# Patient Record
Sex: FEMALE | Race: WHITE | Employment: OTHER | URBAN - METROPOLITAN AREA
[De-identification: names, ages, dates, MRNs, and addresses within clinical notes are randomized per-mention and may not be internally consistent; named-entity substitution may affect disease eponyms.]

---

## 2017-01-10 ENCOUNTER — OFFICE VISIT (OUTPATIENT)
Dept: OBGYN CLINIC | Facility: CLINIC | Age: 62
End: 2017-01-10

## 2017-01-10 VITALS
HEIGHT: 61 IN | BODY MASS INDEX: 26.06 KG/M2 | WEIGHT: 138 LBS | SYSTOLIC BLOOD PRESSURE: 118 MMHG | HEART RATE: 61 BPM | DIASTOLIC BLOOD PRESSURE: 70 MMHG

## 2017-01-10 DIAGNOSIS — Z48.89 POSTOPERATIVE VISIT: Primary | ICD-10-CM

## 2017-01-10 PROCEDURE — 99024 POSTOP FOLLOW-UP VISIT: CPT | Performed by: OBSTETRICS & GYNECOLOGY

## 2017-01-10 NOTE — PROGRESS NOTES
Here for second postoperative visit from anterior repair on November 28th for check for sutures. No issues since surgery. Voiding with problems. No LMP recorded. Patient has had a hysterectomy.   /70 mmHg  Pulse 61  Ht 61\"  Wt 138 lb  BMI 26.09

## 2017-03-28 ENCOUNTER — TELEPHONE (OUTPATIENT)
Dept: OBGYN CLINIC | Facility: CLINIC | Age: 62
End: 2017-03-28

## 2017-03-28 DIAGNOSIS — R92.8 FOLLOW-UP EXAMINATION OF ABNORMAL MAMMOGRAM: Primary | ICD-10-CM

## 2017-03-29 NOTE — TELEPHONE ENCOUNTER
Patient informed that mammogram order was placed. Verbalized understanding. No further questions or concerns.

## 2017-04-11 ENCOUNTER — HOSPITAL ENCOUNTER (OUTPATIENT)
Dept: MAMMOGRAPHY | Facility: HOSPITAL | Age: 62
Discharge: HOME | End: 2017-04-11
Attending: OBSTETRICS & GYNECOLOGY
Payer: COMMERCIAL

## 2017-04-11 DIAGNOSIS — R92.8 FOLLOW-UP EXAMINATION OF ABNORMAL MAMMOGRAM: ICD-10-CM

## 2017-04-11 PROCEDURE — 77065 DX MAMMO INCL CAD UNI: CPT

## 2017-04-11 PROCEDURE — 76642 ULTRASOUND BREAST LIMITED: CPT

## 2017-04-11 PROCEDURE — 77061 BREAST TOMOSYNTHESIS UNI: CPT

## 2017-04-12 NOTE — PROGRESS NOTES
Quick Note:    Stable findings in R breast, and considered benign.  Follow up at annual in 6 months.  ______

## 2017-05-24 ENCOUNTER — HOSPITAL ENCOUNTER (OUTPATIENT)
Age: 62
Discharge: HOME | End: 2017-05-24
Attending: EMERGENCY MEDICINE
Payer: COMMERCIAL

## 2017-05-24 VITALS
HEART RATE: 74 BPM | OXYGEN SATURATION: 97 % | SYSTOLIC BLOOD PRESSURE: 154 MMHG | RESPIRATION RATE: 18 BRPM | BODY MASS INDEX: 23.92 KG/M2 | DIASTOLIC BLOOD PRESSURE: 86 MMHG | HEIGHT: 62 IN | TEMPERATURE: 98 F | WEIGHT: 130 LBS

## 2017-05-24 DIAGNOSIS — S61.219A FINGER LACERATION, INITIAL ENCOUNTER: Primary | ICD-10-CM

## 2017-05-24 PROCEDURE — 99212 OFFICE O/P EST SF 10 MIN: CPT

## 2017-05-24 PROCEDURE — 12001 RPR S/N/AX/GEN/TRNK 2.5CM/<: CPT

## 2017-05-24 PROCEDURE — 99213 OFFICE O/P EST LOW 20 MIN: CPT

## 2017-05-25 NOTE — ED PROVIDER NOTES
Patient Seen in: [facility]    History   Patient presents with:  Laceration Abrasion (integumentary)    Stated Complaint: left finger laceration    HPI    Patient complains of laceration to left fifth finger.   Patient was t 1.00  Years: 21        Quit date: 11/01/1987    Smokeless Status: Never Used                        Alcohol Use: Yes           3.6 oz/week       6 Glasses of wine per week       Comment: 7 glasses weekly      Review of Systems    Positive for stated compla [id number] [phone]    Schedule an appointment as soon as possible for a visit        Medications Prescribed:  Current Discharge Medication List

## 2017-06-03 ENCOUNTER — HOSPITAL ENCOUNTER (OUTPATIENT)
Age: 62
Discharge: HOME | End: 2017-06-03
Attending: FAMILY MEDICINE
Payer: COMMERCIAL

## 2017-06-03 VITALS
BODY MASS INDEX: 24.48 KG/M2 | HEIGHT: 62 IN | HEART RATE: 69 BPM | WEIGHT: 133 LBS | TEMPERATURE: 98 F | DIASTOLIC BLOOD PRESSURE: 66 MMHG | SYSTOLIC BLOOD PRESSURE: 108 MMHG | OXYGEN SATURATION: 99 % | RESPIRATION RATE: 16 BRPM

## 2017-06-03 DIAGNOSIS — Z48.02 ENCOUNTER FOR REMOVAL OF SUTURES: Primary | ICD-10-CM

## 2017-06-03 NOTE — ED INITIAL ASSESSMENT (HPI)
The patient is here for removal of sutures from the left 5th digit. She denies any pain, drainage, or other problems.

## 2017-06-03 NOTE — ED PROVIDER NOTES
Patient is here for suture removal on her left fifth digit at the tip over the pulp of the finger. Sutures are placed about a week ago. Patient denies any pain, tingling, numbness or weakness.   3 simple interrupted sutures were removed without any compli

## 2017-11-01 ENCOUNTER — OFFICE VISIT (OUTPATIENT)
Dept: OBGYN CLINIC | Facility: CLINIC | Age: 62
End: 2017-11-01

## 2017-11-01 VITALS
DIASTOLIC BLOOD PRESSURE: 72 MMHG | WEIGHT: 134 LBS | HEIGHT: 61.75 IN | BODY MASS INDEX: 24.66 KG/M2 | HEART RATE: 64 BPM | SYSTOLIC BLOOD PRESSURE: 112 MMHG

## 2017-11-01 DIAGNOSIS — N60.01 BENIGN CYST OF BREAST, RIGHT: ICD-10-CM

## 2017-11-01 DIAGNOSIS — Z01.411 ENCOUNTER FOR GYNECOLOGICAL EXAMINATION WITH ABNORMAL FINDING: Primary | ICD-10-CM

## 2017-11-01 DIAGNOSIS — R92.8 FOLLOW-UP EXAMINATION OF ABNORMAL MAMMOGRAM: ICD-10-CM

## 2017-11-01 PROCEDURE — 99396 PREV VISIT EST AGE 40-64: CPT | Performed by: OBSTETRICS & GYNECOLOGY

## 2017-11-01 NOTE — PROGRESS NOTES
Patient ID:   Haley Green  is a 57year old female         HPI:     Here for gyne exam. Last seen January for postop visit from laparoscopic LSO and anterior repair. New medical/surgical hx:  None. No LMP recorded.  Patient has had a hys Outpatient Prescriptions on File Prior to Visit:  Pravastatin Sodium 20 MG Oral Tab Take 1 tablet (20 mg total) by mouth nightly. Disp: 90 tablet Rfl: 3   aspirin 81 MG Oral Tab Take 1 tablet (81 mg total) by mouth daily.  Disp: 30 tablet Rfl: 3   Multiple Consults:  [facility] DIAGNOSTIC RIGHT (CPT=77065)    NOTES:  See in one year or as needed.

## 2017-11-30 ENCOUNTER — HOSPITAL ENCOUNTER (OUTPATIENT)
Dept: MAMMOGRAPHY | Facility: HOSPITAL | Age: 62
Discharge: HOME | End: 2017-11-30
Attending: OBSTETRICS & GYNECOLOGY
Payer: COMMERCIAL

## 2017-11-30 DIAGNOSIS — R92.8 FOLLOW-UP EXAMINATION OF ABNORMAL MAMMOGRAM: ICD-10-CM

## 2017-11-30 DIAGNOSIS — N60.01 BENIGN CYST OF BREAST, RIGHT: ICD-10-CM

## 2017-11-30 PROCEDURE — 77066 DX MAMMO INCL CAD BI: CPT | Performed by: OBSTETRICS & GYNECOLOGY

## 2017-11-30 PROCEDURE — 76642 ULTRASOUND BREAST LIMITED: CPT | Performed by: OBSTETRICS & GYNECOLOGY

## 2017-11-30 PROCEDURE — 77062 BREAST TOMOSYNTHESIS BI: CPT | Performed by: OBSTETRICS & GYNECOLOGY

## 2018-12-03 ENCOUNTER — HOSPITAL ENCOUNTER (OUTPATIENT)
Dept: MAMMOGRAPHY | Facility: HOSPITAL | Age: 63
Discharge: HOME | End: 2018-12-03
Attending: INTERNAL MEDICINE
Payer: MEDICARE

## 2018-12-03 DIAGNOSIS — Z12.31 ENCOUNTER FOR SCREENING MAMMOGRAM FOR MALIGNANT NEOPLASM OF BREAST: ICD-10-CM

## 2018-12-03 PROCEDURE — 77067 SCR MAMMO BI INCL CAD: CPT | Performed by: INTERNAL MEDICINE

## 2018-12-03 PROCEDURE — 77063 BREAST TOMOSYNTHESIS BI: CPT | Performed by: INTERNAL MEDICINE

## 2019-11-13 ENCOUNTER — OFFICE VISIT (OUTPATIENT)
Dept: OBGYN CLINIC | Facility: CLINIC | Age: 64
End: 2019-11-13
Payer: MEDICARE

## 2019-11-13 VITALS
BODY MASS INDEX: 25.21 KG/M2 | WEIGHT: 137 LBS | SYSTOLIC BLOOD PRESSURE: 138 MMHG | HEIGHT: 62 IN | DIASTOLIC BLOOD PRESSURE: 76 MMHG

## 2019-11-13 DIAGNOSIS — N81.11 CYSTOCELE, MIDLINE: ICD-10-CM

## 2019-11-13 DIAGNOSIS — Z01.419 WELL WOMAN EXAM WITH ROUTINE GYNECOLOGICAL EXAM: Primary | ICD-10-CM

## 2019-11-13 PROCEDURE — G0101 CA SCREEN;PELVIC/BREAST EXAM: HCPCS | Performed by: OBSTETRICS & GYNECOLOGY

## 2019-11-13 NOTE — PROGRESS NOTES
GYN H&P     2019  5:48 PM    CC: Patient is here for annual exam, cystocele    HPI: patient is a 75year old  here for her annual gyn exam.   She reports a h/o ELMER and LSO as well as cystocele repair by Dr. Cata Bolaños.  However, she feels as though VOMITING  Latex                   ITCHING  OMEGA-3 FATTY ACIDS OR, , Disp: , Rfl:   Magnesium 400 MG Oral Cap, , Disp: , Rfl:   Probiotic Product (PROBIOTIC OR), , Disp: , Rfl:   Omega-3 Fatty Acids (OMEGAPURE 780 EC OR), , Disp: , Rfl:   Pravastatin Sodiu change and no tenderness. Breasts are symmetrical.   Abdominal: Soft. Normal appearance and bowel sounds are normal. She exhibits no mass. Well healed midline vertical skin incision and laparoscopy skin incisions   Genitourinary: No labial fusion.  There

## 2019-12-18 ENCOUNTER — HOSPITAL ENCOUNTER (OUTPATIENT)
Dept: MAMMOGRAPHY | Facility: HOSPITAL | Age: 64
Discharge: HOME | End: 2019-12-18
Attending: OBSTETRICS & GYNECOLOGY
Payer: MEDICARE

## 2019-12-18 DIAGNOSIS — Z01.419 WELL WOMAN EXAM WITH ROUTINE GYNECOLOGICAL EXAM: ICD-10-CM

## 2019-12-18 PROCEDURE — 77063 BREAST TOMOSYNTHESIS BI: CPT | Performed by: OBSTETRICS & GYNECOLOGY

## 2019-12-18 PROCEDURE — 77067 SCR MAMMO BI INCL CAD: CPT | Performed by: OBSTETRICS & GYNECOLOGY

## 2019-12-24 ENCOUNTER — HOSPITAL ENCOUNTER (OUTPATIENT)
Dept: MAMMOGRAPHY | Facility: HOSPITAL | Age: 64
Discharge: HOME | End: 2019-12-24
Attending: OBSTETRICS & GYNECOLOGY
Payer: MEDICARE

## 2019-12-24 DIAGNOSIS — R92.2 INCONCLUSIVE MAMMOGRAM: ICD-10-CM

## 2019-12-24 PROCEDURE — 77062 BREAST TOMOSYNTHESIS BI: CPT | Performed by: OBSTETRICS & GYNECOLOGY

## 2019-12-24 PROCEDURE — 77066 DX MAMMO INCL CAD BI: CPT | Performed by: OBSTETRICS & GYNECOLOGY

## 2019-12-24 PROCEDURE — 76642 ULTRASOUND BREAST LIMITED: CPT | Performed by: OBSTETRICS & GYNECOLOGY

## 2019-12-26 ENCOUNTER — TELEPHONE (OUTPATIENT)
Dept: MAMMOGRAPHY | Facility: HOSPITAL | Age: 64
End: 2019-12-26

## 2019-12-26 NOTE — PROGRESS NOTES
Patient notified. Patient verbalized understanding. Pt was already informed by mammography of results and recommendations.

## 2019-12-26 NOTE — TELEPHONE ENCOUNTER
Patient called with additional questions re: upcoming breast biopsy. Questions answered and patient verbalized understanding.

## 2019-12-26 NOTE — PROGRESS NOTES
Results reviewed. Please inform patient radiology recommends a breast biopsy. They may have already informed her. The area is very small and has a low suspicion for malignancy but because it is new they recommend biopsy.  Thank you

## 2020-01-13 ENCOUNTER — HOSPITAL ENCOUNTER (OUTPATIENT)
Dept: MAMMOGRAPHY | Facility: HOSPITAL | Age: 65
Discharge: HOME | End: 2020-01-13
Attending: OBSTETRICS & GYNECOLOGY
Payer: MEDICARE

## 2020-01-13 DIAGNOSIS — R92.8 ABNORMAL MAMMOGRAM: ICD-10-CM

## 2020-01-13 PROCEDURE — 88305 TISSUE EXAM BY PATHOLOGIST: CPT | Performed by: OBSTETRICS & GYNECOLOGY

## 2020-01-13 PROCEDURE — 19081 BX BREAST 1ST LESION STRTCTC: CPT | Performed by: OBSTETRICS & GYNECOLOGY

## 2020-01-14 NOTE — PROGRESS NOTES
Please let pt know her breast bx was normal-->a variant of fibrocystic breast changes. I would recommend she f/u in 6 months, to ensure no further changes, but she does not need further treatment at this time. Thanks!

## 2020-01-16 ENCOUNTER — TELEPHONE (OUTPATIENT)
Dept: MAMMOGRAPHY | Facility: HOSPITAL | Age: 65
End: 2020-01-16

## 2020-01-16 NOTE — TELEPHONE ENCOUNTER
Telephoned [place] and name,  verified with patient. Notified [place] of negative for malignancy right breast biopsy result. Concordance pending. [place] King reports biopsy site is healing well. Hematoma management discussed.  Radiolo

## 2020-03-10 ENCOUNTER — OFFICE VISIT (OUTPATIENT)
Dept: UROLOGY | Facility: HOSPITAL | Age: 65
End: 2020-03-10
Attending: OBSTETRICS & GYNECOLOGY
Payer: MEDICARE

## 2020-03-10 VITALS
WEIGHT: 137 LBS | BODY MASS INDEX: 25.21 KG/M2 | SYSTOLIC BLOOD PRESSURE: 161 MMHG | DIASTOLIC BLOOD PRESSURE: 92 MMHG | HEIGHT: 62 IN

## 2020-03-10 DIAGNOSIS — N81.84 PELVIC MUSCLE WASTING: ICD-10-CM

## 2020-03-10 DIAGNOSIS — N39.3 FEMALE STRESS INCONTINENCE: ICD-10-CM

## 2020-03-10 DIAGNOSIS — N95.2 POSTMENOPAUSAL ATROPHIC VAGINITIS: ICD-10-CM

## 2020-03-10 DIAGNOSIS — R35.0 FREQUENCY OF MICTURITION: Primary | ICD-10-CM

## 2020-03-10 DIAGNOSIS — N81.11 CYSTOCELE, MIDLINE: ICD-10-CM

## 2020-03-10 DIAGNOSIS — N99.3 PROLAPSE OF VAGINAL VAULT AFTER HYSTERECTOMY: ICD-10-CM

## 2020-03-10 LAB
BLOOD URINE: NEGATIVE
CONTROL RUN WITHIN 24 HOURS?: YES
LEUKOCYTE ESTERASE URINE: NEGATIVE
NITRITE URINE: NEGATIVE

## 2020-03-10 PROCEDURE — 87086 URINE CULTURE/COLONY COUNT: CPT | Performed by: OBSTETRICS & GYNECOLOGY

## 2020-03-10 NOTE — PROGRESS NOTES
Cea, DO  3/10/2020     Referred by Dr. Davey Bailey  Pt here with self    Patient presents with:  Prolapse: referred by Dr. Jesu Ruiz repair with laparoscopy 2016  hyst 2000 for fibroids    HPI:  +LUIS  Rare UUI  + prolapse, shifts position to voi Smoker        Packs/day: 1.00        Years: 20.00        Pack years: 21        Quit date: 1987        Years since quittin.3      Smokeless tobacco: Never Used    Alcohol use:  Yes      Alcohol/week: 6.0 standard drinks      Types: 6 Glasses of win lesions  Urethra: +atrophy, nontender  Bladder:+fullness, nontender  Vagina: +atrophy  Cervix & Uterus: absent  Adnexa:no masses, nontender  Perineum: nontender  Anus: wnl  Rectum: defer    PELVIS FLOOR NEUROMUSCULAR FUNCTION:  Strength:  2 and Unable to h cream    Treatment Plan, Surgical:   Anterior repair  Uterosacral suspension  Mid-urethral slings (Trans Vaginal Taping, TOT, single-incision)    Pt verbalizes understanding of all above discussed information. All questions answered.  She agrees to plan

## 2020-05-26 ENCOUNTER — OFFICE VISIT (OUTPATIENT)
Dept: UROLOGY | Facility: HOSPITAL | Age: 65
End: 2020-05-26
Attending: OBSTETRICS & GYNECOLOGY
Payer: MEDICARE

## 2020-05-26 VITALS — RESPIRATION RATE: 18 BRPM | BODY MASS INDEX: 25.21 KG/M2 | TEMPERATURE: 97 F | HEIGHT: 62 IN | WEIGHT: 137 LBS

## 2020-05-26 DIAGNOSIS — N39.3 FEMALE STRESS INCONTINENCE: Primary | ICD-10-CM

## 2020-05-26 PROCEDURE — 51784 ANAL/URINARY MUSCLE STUDY: CPT

## 2020-05-26 PROCEDURE — 51729 CYSTOMETROGRAM W/VP&UP: CPT

## 2020-05-26 PROCEDURE — 81002 URINALYSIS NONAUTO W/O SCOPE: CPT

## 2020-05-26 PROCEDURE — 51741 ELECTRO-UROFLOWMETRY FIRST: CPT

## 2020-05-26 PROCEDURE — 51797 INTRAABDOMINAL PRESSURE TEST: CPT

## 2020-05-26 NOTE — PATIENT INSTRUCTIONS
[address]  [place] [id number]  [facility]: [phone]  FAX: [phone]       Urodynamic Testing Discharge Instructions: There are NO dietary or activity restrictions.   You may resume sensations for a while. The catheter may stay in place for a week or more after you go home. Where will I go to get the catheter removed? Your catheter will be removed in the office. Please call the office to schedule a voiding trial with the nurse. frequently in small amounts. · You experience abdominal bloating, pressure or back pain. · You have a fever over 100.5 for 4 hours or above 101.0 anytime. · You have nausea and/or vomiting. · Burning/pain with urination.     Please feel free to call the

## 2020-05-29 ENCOUNTER — LAB ENCOUNTER (OUTPATIENT)
Dept: LAB | Facility: HOSPITAL | Age: 65
End: 2020-05-29
Attending: INTERNAL MEDICINE
Payer: MEDICARE

## 2020-05-29 DIAGNOSIS — I10 HYPERTENSION: Primary | ICD-10-CM

## 2020-05-29 PROCEDURE — 80048 BASIC METABOLIC PNL TOTAL CA: CPT

## 2020-05-29 PROCEDURE — 36415 COLL VENOUS BLD VENIPUNCTURE: CPT

## 2020-06-09 ENCOUNTER — VIRTUAL PHONE E/M (OUTPATIENT)
Dept: UROLOGY | Facility: HOSPITAL | Age: 65
End: 2020-06-09
Attending: OBSTETRICS & GYNECOLOGY
Payer: MEDICARE

## 2020-06-09 DIAGNOSIS — N39.3 FEMALE STRESS INCONTINENCE: Primary | ICD-10-CM

## 2020-06-09 DIAGNOSIS — N99.3 PROLAPSE OF VAGINAL VAULT AFTER HYSTERECTOMY: ICD-10-CM

## 2020-06-09 DIAGNOSIS — R33.9 INCOMPLETE BLADDER EMPTYING: ICD-10-CM

## 2020-06-09 RX ORDER — ACETAMINOPHEN 160 MG
2000 TABLET,DISINTEGRATING ORAL DAILY
COMMUNITY

## 2020-06-09 NOTE — PROGRESS NOTES
Pt presents w/ initial c/o bulge  Urodynamic testing undergone without complication. Results reviewed with patient via telephone  Given circumstances surrounding COVID-19 this visit is being conducted as a televisit with pt's consent.     268/125cc & 678/8

## 2020-06-19 ENCOUNTER — TELEPHONE (OUTPATIENT)
Dept: OBGYN CLINIC | Facility: CLINIC | Age: 65
End: 2020-06-19

## 2020-06-19 DIAGNOSIS — Z98.890 HISTORY OF BENIGN BREAST BIOPSY: Primary | ICD-10-CM

## 2020-06-19 DIAGNOSIS — R92.8 OTHER ABNORMAL AND INCONCLUSIVE FINDINGS ON DIAGNOSTIC IMAGING OF BREAST: ICD-10-CM

## 2020-06-19 NOTE — TELEPHONE ENCOUNTER
Patient had breast biopsy on 01/14/2020. Results were neg but advised christie + US in 6 months. Order entered. No further questions or concerns.

## 2020-06-25 ENCOUNTER — HOSPITAL ENCOUNTER (OUTPATIENT)
Dept: MAMMOGRAPHY | Facility: HOSPITAL | Age: 65
Discharge: HOME | End: 2020-06-25
Attending: OBSTETRICS & GYNECOLOGY
Payer: MEDICARE

## 2020-06-25 DIAGNOSIS — R92.8 OTHER ABNORMAL AND INCONCLUSIVE FINDINGS ON DIAGNOSTIC IMAGING OF BREAST: ICD-10-CM

## 2020-06-25 DIAGNOSIS — Z98.890 HISTORY OF BENIGN BREAST BIOPSY: ICD-10-CM

## 2020-06-25 PROCEDURE — 77062 BREAST TOMOSYNTHESIS BI: CPT | Performed by: OBSTETRICS & GYNECOLOGY

## 2020-06-25 PROCEDURE — 77066 DX MAMMO INCL CAD BI: CPT | Performed by: OBSTETRICS & GYNECOLOGY

## 2020-07-07 ENCOUNTER — TELEPHONE (OUTPATIENT)
Dept: UROLOGY | Facility: HOSPITAL | Age: 65
End: 2020-07-07

## 2020-07-07 ENCOUNTER — OFFICE VISIT (OUTPATIENT)
Dept: UROLOGY | Facility: HOSPITAL | Age: 65
End: 2020-07-07
Attending: OBSTETRICS & GYNECOLOGY
Payer: MEDICARE

## 2020-07-07 VITALS — TEMPERATURE: 98 F | BODY MASS INDEX: 25.21 KG/M2 | RESPIRATION RATE: 16 BRPM | HEIGHT: 62 IN | WEIGHT: 137 LBS

## 2020-07-07 DIAGNOSIS — N39.3 FEMALE STRESS INCONTINENCE: ICD-10-CM

## 2020-07-07 DIAGNOSIS — N81.11 CYSTOCELE, MIDLINE: Primary | ICD-10-CM

## 2020-07-07 DIAGNOSIS — N99.3 PROLAPSE OF VAGINAL VAULT AFTER HYSTERECTOMY: ICD-10-CM

## 2020-07-07 DIAGNOSIS — R33.9 INCOMPLETE BLADDER EMPTYING: ICD-10-CM

## 2020-07-07 PROCEDURE — 99212 OFFICE O/P EST SF 10 MIN: CPT

## 2020-07-07 PROCEDURE — 57160 INSERT PESSARY/OTHER DEVICE: CPT

## 2020-07-07 RX ORDER — ESTRADIOL 0.1 MG/G
CREAM VAGINAL
Qty: 1 TUBE | Refills: 3 | Status: SHIPPED | OUTPATIENT
Start: 2020-07-07 | End: 2021-06-15

## 2020-07-07 NOTE — TELEPHONE ENCOUNTER
Pt calling today after visit. C/o pessary \"moving too much\". Pt is pushing it back up into place. Pt offered RN visit tmrw for re-fitting. Plan per Dr Ruma Garcia to try bigger incont dish w/ smaller knob.

## 2020-07-07 NOTE — PATIENT INSTRUCTIONS
[facility]  [facility]    Fingertip Application Method for Estrogen Vaginal Cream    1. Wash you hands with soap and water and dry thoroughly.     2.  Squeeze out enough cream from the tube to cover 1/2 of your index fin bladder. 2. Wash and dry hands. 3. You may prefer to sit or stand when inserting pessary. 4. Locate the notches on inside of pessary, fold it like a “taco” at these notches. 5. Lubricate with tap water and insert narrow end into vaginal opening.   6. Pu

## 2020-07-07 NOTE — PROGRESS NOTES
Patient presents to follow up LUIS, incomplete bladder emptying, nocturia    She is currently interested in pessary trial    Temp 98.2 °F (36.8 °C)   Resp 16   Ht 62\"   Wt 137 lb (62.1 kg)   BMI 25.06 kg/m²     GEN: NAD  CV: RRR  Pulm: nl effort  Abd: soft

## 2020-07-08 ENCOUNTER — OFFICE VISIT (OUTPATIENT)
Dept: UROLOGY | Facility: HOSPITAL | Age: 65
End: 2020-07-08
Attending: OBSTETRICS & GYNECOLOGY
Payer: MEDICARE

## 2020-07-08 ENCOUNTER — TELEPHONE (OUTPATIENT)
Dept: UROLOGY | Facility: HOSPITAL | Age: 65
End: 2020-07-08

## 2020-07-08 VITALS — BODY MASS INDEX: 25.21 KG/M2 | WEIGHT: 137 LBS | HEIGHT: 62 IN | RESPIRATION RATE: 20 BRPM

## 2020-07-08 DIAGNOSIS — N39.3 FEMALE STRESS INCONTINENCE: ICD-10-CM

## 2020-07-08 DIAGNOSIS — N99.3 PROLAPSE OF VAGINAL VAULT AFTER HYSTERECTOMY: Primary | ICD-10-CM

## 2020-07-08 DIAGNOSIS — N81.11 CYSTOCELE, MIDLINE: ICD-10-CM

## 2020-07-08 DIAGNOSIS — R33.9 INCOMPLETE BLADDER EMPTYING: ICD-10-CM

## 2020-07-08 DIAGNOSIS — R33.9 INCOMPLETE EMPTYING OF BLADDER: ICD-10-CM

## 2020-07-08 DIAGNOSIS — N99.3 VAGINAL VAULT PROLAPSE AFTER HYSTERECTOMY: Primary | ICD-10-CM

## 2020-07-08 DIAGNOSIS — N39.3 SUI (STRESS URINARY INCONTINENCE, FEMALE): ICD-10-CM

## 2020-07-08 PROCEDURE — 57160 INSERT PESSARY/OTHER DEVICE: CPT

## 2020-07-08 NOTE — TELEPHONE ENCOUNTER
Pt phoned back after speaking to Dr Tapia Daily and informed of verbal order to go ahead and proceed w/ PT, but that it would need to be a short trial b/c pt has incomplete emptying. Pt plans to try # 3 incont dish once PF has more strength.  Pt als intereste

## 2020-07-08 NOTE — PROCEDURES
Patient here for pessary re-fitting. # 3 small knob incont dish pessary was fitted on 7/7/20 and patient states once home, she felt it slipping down. Pt kept pushing it back in. # 4 sml knob incont dish pessary was placed without difficulty.   Patient abl

## 2020-07-08 NOTE — TELEPHONE ENCOUNTER
Pt calling  2hrs after visit saying # 4 sml knob incont dish is uncomfortable as well. Pt took pessary out and noted some blood to device. Pt still has # 3 incont dish.  Discussed w/ pt we are a bit limited now w/ pessary choices since she didn't care for t

## 2020-07-21 ENCOUNTER — OFFICE VISIT (OUTPATIENT)
Dept: PHYSICAL THERAPY | Facility: HOSPITAL | Age: 65
End: 2020-07-21
Attending: OBSTETRICS & GYNECOLOGY
Payer: MEDICARE

## 2020-07-21 DIAGNOSIS — N39.3 SUI (STRESS URINARY INCONTINENCE, FEMALE): ICD-10-CM

## 2020-07-21 DIAGNOSIS — N81.11 CYSTOCELE, MIDLINE: ICD-10-CM

## 2020-07-21 DIAGNOSIS — R33.9 INCOMPLETE EMPTYING OF BLADDER: ICD-10-CM

## 2020-07-21 DIAGNOSIS — N99.3 VAGINAL VAULT PROLAPSE AFTER HYSTERECTOMY: ICD-10-CM

## 2020-07-21 PROCEDURE — 97110 THERAPEUTIC EXERCISES: CPT

## 2020-07-21 PROCEDURE — 97112 NEUROMUSCULAR REEDUCATION: CPT

## 2020-07-21 PROCEDURE — 97161 PT EVAL LOW COMPLEX 20 MIN: CPT

## 2020-07-21 NOTE — PROGRESS NOTES
MUSCULOSKELETAL AND PELVIC FLOOR EVALUATION:   Referring Physician: Dr. 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 no    BOWEL HABITS  Types of symptoms:none    SEXUAL HEALTH STATUS  Marinoff Scale: 0      ASSESSMENT  Kristylenny Thurston presents to physical therapy evaluation with primary c/o pelvic floor muscle weakness.  The results of the 2/3/3/3  External Anal Sphincter: nt  Accessory Muscle Use: gluteals, adductors, abdominals    Tissue Laxity Test:  Anterior Wall: Marked  Posterior Wall: Min  Apical: Mod    Internal Palpation: WNL     Today's Treatment and Response:   Patient education w Program instruction and Modalities to include: Electrical stimulation (unattended) and Ultrasound     Education or treatment limitation: None  Rehab Potential:good      Patient/Family/Caregiver was advised of these findings, precautions, and treatment opti

## 2020-07-23 ENCOUNTER — OFFICE VISIT (OUTPATIENT)
Dept: PHYSICAL THERAPY | Facility: HOSPITAL | Age: 65
End: 2020-07-23
Attending: OBSTETRICS & GYNECOLOGY
Payer: MEDICARE

## 2020-07-23 PROCEDURE — 97110 THERAPEUTIC EXERCISES: CPT

## 2020-07-23 PROCEDURE — 97112 NEUROMUSCULAR REEDUCATION: CPT

## 2020-07-23 NOTE — PROGRESS NOTES
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 3. Patient will demonstrate improve PFM isolation, coordination and strength to 3/6/6/6 so she is able to reduce urinary urge and prevent leakage during ADL's.  4. Nocturnal voiding 1x/night for improved sleep.   5. P

## 2020-08-04 ENCOUNTER — OFFICE VISIT (OUTPATIENT)
Dept: PHYSICAL THERAPY | Facility: HOSPITAL | Age: 65
End: 2020-08-04
Attending: OBSTETRICS & GYNECOLOGY
Payer: MEDICARE

## 2020-08-04 PROCEDURE — 97112 NEUROMUSCULAR REEDUCATION: CPT

## 2020-08-04 PROCEDURE — 97110 THERAPEUTIC EXERCISES: CPT

## 2020-08-04 NOTE — PROGRESS NOTES
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 2. Patient will demonstrate improved bladder voiding habits to voiding every 2 hours without urinary leakage.   3. Patient will demonstrate improve PFM isolation, coordination and strength to 3/6/6/6 s

## 2020-08-06 ENCOUNTER — APPOINTMENT (OUTPATIENT)
Dept: PHYSICAL THERAPY | Facility: HOSPITAL | Age: 65
End: 2020-08-06
Attending: OBSTETRICS & GYNECOLOGY
Payer: MEDICARE

## 2020-08-11 ENCOUNTER — OFFICE VISIT (OUTPATIENT)
Dept: PHYSICAL THERAPY | Facility: HOSPITAL | Age: 65
End: 2020-08-11
Attending: OBSTETRICS & GYNECOLOGY
Payer: MEDICARE

## 2020-08-11 PROCEDURE — 97112 NEUROMUSCULAR REEDUCATION: CPT

## 2020-08-11 PROCEDURE — 97110 THERAPEUTIC EXERCISES: CPT

## 2020-08-11 NOTE — PROGRESS NOTES
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 2. Patient will demonstrate improved bladder voiding habits to voiding every 2 hours without urinary leakage.   3. Patient will demonstrate improve PFM isolation, coordination and stre w pelvic brace x10    Issued pelvic brace handout Ther Ex:     Partial Formal re-assessment     Shuttle- DLP  25# with pelvic brace + iso hip adduction x30 reps    Balance system weight shift R/L w pelvic brace x3 min    Sit on ball-  pelvic brace + U/LE l

## 2020-08-13 ENCOUNTER — APPOINTMENT (OUTPATIENT)
Dept: PHYSICAL THERAPY | Facility: HOSPITAL | Age: 65
End: 2020-08-13
Attending: OBSTETRICS & GYNECOLOGY
Payer: MEDICARE

## 2020-08-18 ENCOUNTER — OFFICE VISIT (OUTPATIENT)
Dept: UROLOGY | Facility: HOSPITAL | Age: 65
End: 2020-08-18
Attending: OBSTETRICS & GYNECOLOGY
Payer: MEDICARE

## 2020-08-18 VITALS
HEIGHT: 62 IN | HEART RATE: 64 BPM | DIASTOLIC BLOOD PRESSURE: 77 MMHG | WEIGHT: 133 LBS | SYSTOLIC BLOOD PRESSURE: 136 MMHG | BODY MASS INDEX: 24.48 KG/M2

## 2020-08-18 DIAGNOSIS — N99.3 VAGINAL VAULT PROLAPSE AFTER HYSTERECTOMY: Primary | ICD-10-CM

## 2020-08-18 DIAGNOSIS — R33.9 INCOMPLETE EMPTYING OF BLADDER: ICD-10-CM

## 2020-08-18 DIAGNOSIS — N95.2 POSTMENOPAUSAL ATROPHIC VAGINITIS: ICD-10-CM

## 2020-08-18 DIAGNOSIS — N81.84 PELVIC MUSCLE WASTING: ICD-10-CM

## 2020-08-18 PROCEDURE — 51798 US URINE CAPACITY MEASURE: CPT

## 2020-08-18 PROCEDURE — 99212 OFFICE O/P EST SF 10 MIN: CPT

## 2020-08-18 NOTE — PROGRESS NOTES
Patient presents to follow up bulge    She is currently using pessary, pelvic floor PT, vag estrogen    Bulge no worse  Pessary uncomfortable, trying different styles  Denies vag bleeding or discharge    Voids improved, denies UTI      /77   Pulse 64

## 2020-08-20 ENCOUNTER — TELEPHONE (OUTPATIENT)
Dept: UROLOGY | Facility: HOSPITAL | Age: 65
End: 2020-08-20

## 2020-08-20 NOTE — TELEPHONE ENCOUNTER
Pt called after visit d/t pessary was uncomfortable so she removed it this morning, which she does self maintain and she had light yellow/brownish discharge.   Patient wondering if this is normal.  Explained to patient it is normal to have discharge d/t for

## 2020-08-24 ENCOUNTER — OFFICE VISIT (OUTPATIENT)
Dept: PHYSICAL THERAPY | Facility: HOSPITAL | Age: 65
End: 2020-08-24
Attending: OBSTETRICS & GYNECOLOGY
Payer: MEDICARE

## 2020-08-24 PROCEDURE — 97112 NEUROMUSCULAR REEDUCATION: CPT

## 2020-08-24 PROCEDURE — 97110 THERAPEUTIC EXERCISES: CPT

## 2020-08-24 NOTE — PROGRESS NOTES
Discharge Summary  Pt has attended 5 visits in Physical Therapy.       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 All goals met. Pt motivated to continue HEP.       Goals: muscle anatomy and phys    biofeedback -  external sensor was placed and leads were attached  Resting tone  Tonic  Phasic  Huslia  with coordinated breathing       Biofeedback with constant interpretation of results.        PFM NM  Re-ed     bladder/Bowel v

## 2020-08-25 ENCOUNTER — APPOINTMENT (OUTPATIENT)
Dept: PHYSICAL THERAPY | Facility: HOSPITAL | Age: 65
End: 2020-08-25
Attending: OBSTETRICS & GYNECOLOGY
Payer: MEDICARE

## 2020-08-27 ENCOUNTER — APPOINTMENT (OUTPATIENT)
Dept: PHYSICAL THERAPY | Facility: HOSPITAL | Age: 65
End: 2020-08-27
Attending: OBSTETRICS & GYNECOLOGY
Payer: MEDICARE

## 2020-10-13 ENCOUNTER — TELEPHONE (OUTPATIENT)
Dept: UROLOGY | Facility: HOSPITAL | Age: 65
End: 2020-10-13

## 2020-10-13 NOTE — TELEPHONE ENCOUNTER
Pt called nurseline to report she cancelled today's follow up with Dr. Jayme Ramirez as she has been unable to wear pessary and is considering surgery, would like to speak with RN,  mallory Au for return call from patient to discuss.

## 2021-02-22 ENCOUNTER — OFFICE VISIT (OUTPATIENT)
Dept: UROLOGY | Facility: HOSPITAL | Age: 66
End: 2021-02-22
Attending: OBSTETRICS & GYNECOLOGY
Payer: MEDICARE

## 2021-02-22 ENCOUNTER — TELEPHONE (OUTPATIENT)
Dept: UROLOGY | Facility: HOSPITAL | Age: 66
End: 2021-02-22

## 2021-02-22 DIAGNOSIS — R30.0 DYSURIA: Primary | ICD-10-CM

## 2021-02-22 LAB
MULTISTIX LOT#: 3014 NUMERIC
NITRITE, URINE: NEGATIVE
OCCULT BLOOD: NEGATIVE

## 2021-02-22 PROCEDURE — 81002 URINALYSIS NONAUTO W/O SCOPE: CPT

## 2021-02-22 PROCEDURE — 87186 SC STD MICRODIL/AGAR DIL: CPT

## 2021-02-22 PROCEDURE — 87077 CULTURE AEROBIC IDENTIFY: CPT

## 2021-02-22 PROCEDURE — 87086 URINE CULTURE/COLONY COUNT: CPT

## 2021-02-22 NOTE — TELEPHONE ENCOUNTER
patient feels like she has a UTI, c/o dysuria, did home chemstrip and it was positive for Leuk's, offered to have patient come in later this morning to drop off a urine, she agree's, will come in at 11:30am

## 2021-02-22 NOTE — PROGRESS NOTES
Patient here for UTI screen, c/o dysuria and frequency on and off over the last 2 days, did a home urine dip with positive Leuk's, chemstrip performed, not sure if she has a UTI, will PEND for culture results

## 2021-02-24 ENCOUNTER — TELEPHONE (OUTPATIENT)
Dept: UROLOGY | Facility: HOSPITAL | Age: 66
End: 2021-02-24

## 2021-02-24 RX ORDER — NITROFURANTOIN 25; 75 MG/1; MG/1
100 CAPSULE ORAL 2 TIMES DAILY
Qty: 14 CAPSULE | Refills: 0 | Status: SHIPPED | OUTPATIENT
Start: 2021-02-24 | End: 2021-03-03

## 2021-02-24 NOTE — TELEPHONE ENCOUNTER
Per written order from Dr. Paige Strong, maacrobid 100 mg tabs one tab po BID for 7 days, patient notified, medication e-scribed

## 2021-03-04 ENCOUNTER — OFFICE VISIT (OUTPATIENT)
Dept: OBGYN CLINIC | Facility: CLINIC | Age: 66
End: 2021-03-04
Payer: MEDICARE

## 2021-03-04 VITALS
BODY MASS INDEX: 24.48 KG/M2 | HEIGHT: 62 IN | WEIGHT: 133 LBS | DIASTOLIC BLOOD PRESSURE: 76 MMHG | SYSTOLIC BLOOD PRESSURE: 114 MMHG

## 2021-03-04 DIAGNOSIS — Z13.820 SCREENING FOR OSTEOPOROSIS: ICD-10-CM

## 2021-03-04 DIAGNOSIS — Z12.39 ENCOUNTER FOR SCREENING FOR MALIGNANT NEOPLASM OF BREAST, UNSPECIFIED SCREENING MODALITY: ICD-10-CM

## 2021-03-04 DIAGNOSIS — Z01.419 WELL WOMAN EXAM WITH ROUTINE GYNECOLOGICAL EXAM: Primary | ICD-10-CM

## 2021-03-04 DIAGNOSIS — M85.88 OTHER SPECIFIED DISORDERS OF BONE DENSITY AND STRUCTURE, OTHER SITE: ICD-10-CM

## 2021-03-04 DIAGNOSIS — Z12.31 ENCOUNTER FOR SCREENING MAMMOGRAM FOR MALIGNANT NEOPLASM OF BREAST: ICD-10-CM

## 2021-03-04 DIAGNOSIS — N81.11 CYSTOCELE, MIDLINE: ICD-10-CM

## 2021-03-04 PROCEDURE — G0101 CA SCREEN;PELVIC/BREAST EXAM: HCPCS | Performed by: OBSTETRICS & GYNECOLOGY

## 2021-03-04 NOTE — PROGRESS NOTES
GYN H&P     3/4/2021  12:47 PM    CC: Patient is here for annual exam    HPI: patient is a 77year old  here for her annual exam  Pt with h/o ELMER, LSO and cystocele repair by Dr. Latisha Ruiz    Seen last year and felt cystocele had returned.  She was refe (CPT=19081)  01/2020   • OOPHORECTOMY Left 11/30/2016    left salpingooopherectomy   • TONSILLECTOMY         Morphine                NAUSEA AND VOMITING  Latex                   ITCHING    •  Pravastatin Sodium 40 MG Oral Tab, Take 1 tablet (40 mg total) b BMI 24.33 kg/m²     Physical Exam   Vitals reviewed. Constitutional: She appears well-developed and well-nourished. Pulmonary/Chest: Right breast exhibits no inverted nipple, no mass, no nipple discharge, no skin change and no tenderness.  Left breast e complex.    F/u for annual exam or PRN    Santos Obrien MD

## 2021-06-07 ENCOUNTER — ORDER TRANSCRIPTION (OUTPATIENT)
Dept: SLEEP CENTER | Age: 66
End: 2021-06-07

## 2021-06-07 DIAGNOSIS — G47.33 OBSTRUCTIVE SLEEP APNEA (ADULT) (PEDIATRIC): Primary | ICD-10-CM

## 2021-06-12 ENCOUNTER — LAB ENCOUNTER (OUTPATIENT)
Dept: LAB | Facility: HOSPITAL | Age: 66
End: 2021-06-12
Attending: INTERNAL MEDICINE
Payer: MEDICARE

## 2021-06-12 DIAGNOSIS — G47.33 OBSTRUCTIVE SLEEP APNEA (ADULT) (PEDIATRIC): ICD-10-CM

## 2021-06-15 ENCOUNTER — OFFICE VISIT (OUTPATIENT)
Dept: SLEEP CENTER | Age: 66
End: 2021-06-15
Attending: INTERNAL MEDICINE
Payer: MEDICARE

## 2021-06-15 ENCOUNTER — OFFICE VISIT (OUTPATIENT)
Dept: UROLOGY | Facility: HOSPITAL | Age: 66
End: 2021-06-15
Attending: OBSTETRICS & GYNECOLOGY
Payer: MEDICARE

## 2021-06-15 VITALS — WEIGHT: 133 LBS | HEIGHT: 62 IN | TEMPERATURE: 98 F | BODY MASS INDEX: 24.48 KG/M2

## 2021-06-15 DIAGNOSIS — R33.9 INCOMPLETE EMPTYING OF BLADDER: ICD-10-CM

## 2021-06-15 DIAGNOSIS — N99.3 PROLAPSE OF VAGINAL VAULT AFTER HYSTERECTOMY: Primary | ICD-10-CM

## 2021-06-15 DIAGNOSIS — N39.3 SUI (STRESS URINARY INCONTINENCE, FEMALE): ICD-10-CM

## 2021-06-15 DIAGNOSIS — R06.81 APNEA: ICD-10-CM

## 2021-06-15 DIAGNOSIS — N95.2 POSTMENOPAUSAL ATROPHIC VAGINITIS: ICD-10-CM

## 2021-06-15 PROCEDURE — 99212 OFFICE O/P EST SF 10 MIN: CPT

## 2021-06-15 PROCEDURE — 95810 POLYSOM 6/> YRS 4/> PARAM: CPT

## 2021-06-15 RX ORDER — ESTRADIOL 0.1 MG/G
CREAM VAGINAL
Qty: 43 G | Refills: 3 | Status: SHIPPED | OUTPATIENT
Start: 2021-06-15

## 2021-06-15 NOTE — PROGRESS NOTES
Pt presents w/ initial c/o bulge  Urodynamic testing undergone without complication.   Results reviewed with patient    268/125cc & 463/45cc   DO  [facility] 375cc  +LUIS @ 330cc reduced    Discussed with patient mgmt options for incomplete bladder emptying, POP,

## 2021-06-21 ENCOUNTER — HOSPITAL ENCOUNTER (OUTPATIENT)
Dept: BONE DENSITY | Age: 66
Discharge: HOME | End: 2021-06-21
Attending: OBSTETRICS & GYNECOLOGY
Payer: MEDICARE

## 2021-06-21 ENCOUNTER — HOSPITAL ENCOUNTER (OUTPATIENT)
Dept: MAMMOGRAPHY | Age: 66
Discharge: HOME | End: 2021-06-21
Attending: OBSTETRICS & GYNECOLOGY
Payer: MEDICARE

## 2021-06-21 DIAGNOSIS — Z13.820 SCREENING FOR OSTEOPOROSIS: ICD-10-CM

## 2021-06-21 DIAGNOSIS — M85.88 OTHER SPECIFIED DISORDERS OF BONE DENSITY AND STRUCTURE, OTHER SITE: ICD-10-CM

## 2021-06-21 DIAGNOSIS — Z12.39 ENCOUNTER FOR SCREENING FOR MALIGNANT NEOPLASM OF BREAST, UNSPECIFIED SCREENING MODALITY: ICD-10-CM

## 2021-06-21 DIAGNOSIS — Z12.31 ENCOUNTER FOR SCREENING MAMMOGRAM FOR MALIGNANT NEOPLASM OF BREAST: ICD-10-CM

## 2021-06-21 PROCEDURE — 77080 DXA BONE DENSITY AXIAL: CPT | Performed by: OBSTETRICS & GYNECOLOGY

## 2021-06-21 PROCEDURE — 77067 SCR MAMMO BI INCL CAD: CPT | Performed by: OBSTETRICS & GYNECOLOGY

## 2021-06-21 PROCEDURE — 77063 BREAST TOMOSYNTHESIS BI: CPT | Performed by: OBSTETRICS & GYNECOLOGY

## 2021-08-05 ENCOUNTER — OFFICE VISIT (OUTPATIENT)
Dept: SLEEP CENTER | Age: 66
End: 2021-08-05
Attending: INTERNAL MEDICINE
Payer: MEDICARE

## 2021-08-05 DIAGNOSIS — G47.33 OSA (OBSTRUCTIVE SLEEP APNEA): ICD-10-CM

## 2021-08-05 PROCEDURE — 95811 POLYSOM 6/>YRS CPAP 4/> PARM: CPT

## 2021-09-09 ENCOUNTER — LAB ENCOUNTER (OUTPATIENT)
Dept: LAB | Facility: HOSPITAL | Age: 66
End: 2021-09-09
Attending: INTERNAL MEDICINE
Payer: MEDICARE

## 2021-09-09 DIAGNOSIS — Z01.818 PRE-OP TESTING: ICD-10-CM

## 2021-09-09 LAB
ANION GAP SERPL CALC-SCNC: 3 MMOL/L (ref 0–18)
BASOPHILS # BLD AUTO: 0.05 X10(3) UL (ref 0–0.2)
BASOPHILS NFR BLD AUTO: 0.8 %
BUN BLD-MCNC: 14 MG/DL (ref 7–18)
CALCIUM BLD-MCNC: 9.1 MG/DL (ref 8.5–10.1)
CHLORIDE SERPL-SCNC: 105 MMOL/L (ref 98–112)
CO2 SERPL-SCNC: 29 MMOL/L (ref 21–32)
CREAT BLD-MCNC: 0.69 MG/DL
EOSINOPHIL # BLD AUTO: 0.2 X10(3) UL (ref 0–0.7)
EOSINOPHIL NFR BLD AUTO: 3.4 %
ERYTHROCYTE [DISTWIDTH] IN BLOOD BY AUTOMATED COUNT: 12.7 %
GLUCOSE BLD-MCNC: 98 MG/DL (ref 70–99)
HCT VFR BLD AUTO: 40 %
HGB BLD-MCNC: 13.3 G/DL
IMM GRANULOCYTES # BLD AUTO: 0.01 X10(3) UL (ref 0–1)
IMM GRANULOCYTES NFR BLD: 0.2 %
LYMPHOCYTES # BLD AUTO: 3.32 X10(3) UL (ref 1–4)
LYMPHOCYTES NFR BLD AUTO: 55.8 %
MCH RBC QN AUTO: 30.5 PG (ref 26–34)
MCHC RBC AUTO-ENTMCNC: 33.3 G/DL (ref 31–37)
MCV RBC AUTO: 91.7 FL
MONOCYTES # BLD AUTO: 0.54 X10(3) UL (ref 0.1–1)
MONOCYTES NFR BLD AUTO: 9.1 %
NEUTROPHILS # BLD AUTO: 1.83 X10 (3) UL (ref 1.5–7.7)
NEUTROPHILS # BLD AUTO: 1.83 X10(3) UL (ref 1.5–7.7)
NEUTROPHILS NFR BLD AUTO: 30.7 %
OSMOLALITY SERPL CALC.SUM OF ELEC: 284 MOSM/KG (ref 275–295)
PATIENT FASTING Y/N/NP: YES
PLATELET # BLD AUTO: 332 10(3)UL (ref 150–450)
POTASSIUM SERPL-SCNC: 4.6 MMOL/L (ref 3.5–5.1)
RBC # BLD AUTO: 4.36 X10(6)UL
SODIUM SERPL-SCNC: 137 MMOL/L (ref 136–145)
WBC # BLD AUTO: 6 X10(3) UL (ref 4–11)

## 2021-09-09 PROCEDURE — 36415 COLL VENOUS BLD VENIPUNCTURE: CPT

## 2021-09-09 PROCEDURE — 85025 COMPLETE CBC W/AUTO DIFF WBC: CPT

## 2021-09-09 PROCEDURE — 80048 BASIC METABOLIC PNL TOTAL CA: CPT

## 2021-09-16 RX ORDER — ACETAMINOPHEN 500 MG
1000 TABLET ORAL ONCE
Status: CANCELLED | OUTPATIENT
Start: 2021-09-16 | End: 2021-09-16

## 2021-09-16 RX ORDER — VIT A/VIT C/VIT E/ZINC/COPPER 2148-113
1 TABLET ORAL DAILY
COMMUNITY

## 2021-09-16 RX ORDER — YEAST,DRIED (S. CEREVISIAE)
1 POWDER (GRAM) ORAL DAILY
COMMUNITY

## 2021-09-16 RX ORDER — AMPICILLIN TRIHYDRATE 250 MG
1 CAPSULE ORAL DAILY
COMMUNITY

## 2021-09-20 ENCOUNTER — LAB ENCOUNTER (OUTPATIENT)
Dept: LAB | Facility: HOSPITAL | Age: 66
End: 2021-09-20
Attending: OBSTETRICS & GYNECOLOGY
Payer: MEDICARE

## 2021-09-20 DIAGNOSIS — N81.11 CYSTOCELE, MIDLINE: ICD-10-CM

## 2021-09-21 LAB — SARS-COV-2 RNA RESP QL NAA+PROBE: NOT DETECTED

## 2021-09-22 ENCOUNTER — ANESTHESIA EVENT (OUTPATIENT)
Dept: SURGERY | Facility: HOSPITAL | Age: 66
End: 2021-09-22
Payer: MEDICARE

## 2021-09-22 NOTE — H&P
75 y/o female with vaginal vault prolapse and stress urinary incontinence with incomplete bladder emptying for surgical mgmt  Had pessary, didn't like it  Saw PT with minimal relief  Wants surgery, wants to be sexually active, doesn't want mesh surgery

## 2021-09-23 ENCOUNTER — HOSPITAL ENCOUNTER (OUTPATIENT)
Facility: HOSPITAL | Age: 66
Discharge: HOME | End: 2021-09-24
Attending: OBSTETRICS & GYNECOLOGY | Admitting: OBSTETRICS & GYNECOLOGY
Payer: MEDICARE

## 2021-09-23 ENCOUNTER — ANESTHESIA (OUTPATIENT)
Dept: SURGERY | Facility: HOSPITAL | Age: 66
End: 2021-09-23
Payer: MEDICARE

## 2021-09-23 DIAGNOSIS — N81.11 CYSTOCELE, MIDLINE: Primary | ICD-10-CM

## 2021-09-23 PROCEDURE — 0TSD0ZZ REPOSITION URETHRA, OPEN APPROACH: ICD-10-PCS | Performed by: OBSTETRICS & GYNECOLOGY

## 2021-09-23 PROCEDURE — 0JQC0ZZ REPAIR PELVIC REGION SUBCUTANEOUS TISSUE AND FASCIA, OPEN APPROACH: ICD-10-PCS | Performed by: OBSTETRICS & GYNECOLOGY

## 2021-09-23 PROCEDURE — 0USG7ZZ REPOSITION VAGINA, VIA NATURAL OR ARTIFICIAL OPENING: ICD-10-PCS | Performed by: OBSTETRICS & GYNECOLOGY

## 2021-09-23 PROCEDURE — 0UQF0ZZ REPAIR CUL-DE-SAC, OPEN APPROACH: ICD-10-PCS | Performed by: OBSTETRICS & GYNECOLOGY

## 2021-09-23 PROCEDURE — 0WQN0ZZ REPAIR FEMALE PERINEUM, OPEN APPROACH: ICD-10-PCS | Performed by: OBSTETRICS & GYNECOLOGY

## 2021-09-23 PROCEDURE — 94660 CPAP INITIATION&MGMT: CPT

## 2021-09-23 DEVICE — TRANSVAGINAL MID-URETHRAL SLING
Type: IMPLANTABLE DEVICE | Site: VAGINA | Status: FUNCTIONAL
Brand: ADVANTAGE FIT™ BLUE SYSTEM

## 2021-09-23 RX ORDER — KETOROLAC TROMETHAMINE 30 MG/ML
INJECTION, SOLUTION INTRAMUSCULAR; INTRAVENOUS AS NEEDED
Status: DISCONTINUED | OUTPATIENT
Start: 2021-09-23 | End: 2021-09-23 | Stop reason: SURG

## 2021-09-23 RX ORDER — ONDANSETRON 2 MG/ML
INJECTION INTRAMUSCULAR; INTRAVENOUS AS NEEDED
Status: DISCONTINUED | OUTPATIENT
Start: 2021-09-23 | End: 2021-09-23 | Stop reason: SURG

## 2021-09-23 RX ORDER — ATORVASTATIN CALCIUM 10 MG/1
10 TABLET, FILM COATED ORAL NIGHTLY
Status: DISCONTINUED | OUTPATIENT
Start: 2021-09-23 | End: 2021-09-24

## 2021-09-23 RX ORDER — HYDROMORPHONE HYDROCHLORIDE 1 MG/ML
0.2 INJECTION, SOLUTION INTRAMUSCULAR; INTRAVENOUS; SUBCUTANEOUS EVERY 2 HOUR PRN
Status: DISCONTINUED | OUTPATIENT
Start: 2021-09-23 | End: 2021-09-24

## 2021-09-23 RX ORDER — MEPERIDINE HYDROCHLORIDE 25 MG/ML
12.5 INJECTION INTRAMUSCULAR; INTRAVENOUS; SUBCUTANEOUS AS NEEDED
Status: DISCONTINUED | OUTPATIENT
Start: 2021-09-23 | End: 2021-09-23 | Stop reason: HOSPADM

## 2021-09-23 RX ORDER — METHYLCELLULOSE 2 G/19G
POWDER, FOR SOLUTION ORAL DAILY
COMMUNITY

## 2021-09-23 RX ORDER — ROCURONIUM BROMIDE 10 MG/ML
INJECTION, SOLUTION INTRAVENOUS AS NEEDED
Status: DISCONTINUED | OUTPATIENT
Start: 2021-09-23 | End: 2021-09-23 | Stop reason: SURG

## 2021-09-23 RX ORDER — SODIUM CHLORIDE, SODIUM LACTATE, POTASSIUM CHLORIDE, CALCIUM CHLORIDE 600; 310; 30; 20 MG/100ML; MG/100ML; MG/100ML; MG/100ML
INJECTION, SOLUTION INTRAVENOUS CONTINUOUS
Status: DISCONTINUED | OUTPATIENT
Start: 2021-09-23 | End: 2021-09-23 | Stop reason: HOSPADM

## 2021-09-23 RX ORDER — PRAVASTATIN SODIUM 40 MG
40 TABLET ORAL NIGHTLY
COMMUNITY

## 2021-09-23 RX ORDER — GLYCERIN 0.22 G/100ML
1 SOLUTION/ DROPS OPHTHALMIC EVERY MORNING
COMMUNITY
Start: 2019-04-01

## 2021-09-23 RX ORDER — HYDROCODONE BITARTRATE AND ACETAMINOPHEN 5; 325 MG/1; MG/1
1 TABLET ORAL EVERY 4 HOURS PRN
Status: DISCONTINUED | OUTPATIENT
Start: 2021-09-23 | End: 2021-09-24

## 2021-09-23 RX ORDER — SODIUM CHLORIDE, SODIUM LACTATE, POTASSIUM CHLORIDE, CALCIUM CHLORIDE 600; 310; 30; 20 MG/100ML; MG/100ML; MG/100ML; MG/100ML
INJECTION, SOLUTION INTRAVENOUS CONTINUOUS
Status: DISCONTINUED | OUTPATIENT
Start: 2021-09-23 | End: 2021-09-24

## 2021-09-23 RX ORDER — BUPIVACAINE HYDROCHLORIDE AND EPINEPHRINE 2.5; 5 MG/ML; UG/ML
INJECTION, SOLUTION EPIDURAL; INFILTRATION; INTRACAUDAL; PERINEURAL AS NEEDED
Status: DISCONTINUED | OUTPATIENT
Start: 2021-09-23 | End: 2021-09-23 | Stop reason: HOSPADM

## 2021-09-23 RX ORDER — KETOROLAC TROMETHAMINE 15 MG/ML
15 INJECTION, SOLUTION INTRAMUSCULAR; INTRAVENOUS EVERY 6 HOURS
Status: DISCONTINUED | OUTPATIENT
Start: 2021-09-23 | End: 2021-09-24

## 2021-09-23 RX ORDER — HYDROMORPHONE HYDROCHLORIDE 1 MG/ML
0.4 INJECTION, SOLUTION INTRAMUSCULAR; INTRAVENOUS; SUBCUTANEOUS EVERY 2 HOUR PRN
Status: DISCONTINUED | OUTPATIENT
Start: 2021-09-23 | End: 2021-09-24

## 2021-09-23 RX ORDER — EPHEDRINE SULFATE 50 MG/ML
INJECTION INTRAVENOUS AS NEEDED
Status: DISCONTINUED | OUTPATIENT
Start: 2021-09-23 | End: 2021-09-23 | Stop reason: SURG

## 2021-09-23 RX ORDER — HYDROCODONE BITARTRATE AND ACETAMINOPHEN 5; 325 MG/1; MG/1
1 TABLET ORAL EVERY 4 HOURS PRN
Qty: 20 TABLET | Refills: 0 | Status: SHIPPED | OUTPATIENT
Start: 2021-09-23 | End: 2021-10-12

## 2021-09-23 RX ORDER — CYCLOSPORINE 0.5 MG/ML
1 EMULSION OPHTHALMIC 2 TIMES DAILY
COMMUNITY
Start: 2021-07-23

## 2021-09-23 RX ORDER — HYDROMORPHONE HYDROCHLORIDE 1 MG/ML
0.8 INJECTION, SOLUTION INTRAMUSCULAR; INTRAVENOUS; SUBCUTANEOUS EVERY 2 HOUR PRN
Status: DISCONTINUED | OUTPATIENT
Start: 2021-09-23 | End: 2021-09-24

## 2021-09-23 RX ORDER — LIDOCAINE HYDROCHLORIDE 10 MG/ML
INJECTION, SOLUTION EPIDURAL; INFILTRATION; INTRACAUDAL; PERINEURAL AS NEEDED
Status: DISCONTINUED | OUTPATIENT
Start: 2021-09-23 | End: 2021-09-23 | Stop reason: SURG

## 2021-09-23 RX ORDER — ACETAMINOPHEN 500 MG
1000 TABLET ORAL ONCE AS NEEDED
Status: DISCONTINUED | OUTPATIENT
Start: 2021-09-23 | End: 2021-09-23 | Stop reason: HOSPADM

## 2021-09-23 RX ORDER — ONDANSETRON 2 MG/ML
4 INJECTION INTRAMUSCULAR; INTRAVENOUS AS NEEDED
Status: DISCONTINUED | OUTPATIENT
Start: 2021-09-23 | End: 2021-09-23 | Stop reason: HOSPADM

## 2021-09-23 RX ORDER — HYDROCODONE BITARTRATE AND ACETAMINOPHEN 5; 325 MG/1; MG/1
2 TABLET ORAL EVERY 4 HOURS PRN
Status: DISCONTINUED | OUTPATIENT
Start: 2021-09-23 | End: 2021-09-24

## 2021-09-23 RX ORDER — NALOXONE HYDROCHLORIDE 0.4 MG/ML
80 INJECTION, SOLUTION INTRAMUSCULAR; INTRAVENOUS; SUBCUTANEOUS AS NEEDED
Status: DISCONTINUED | OUTPATIENT
Start: 2021-09-23 | End: 2021-09-23 | Stop reason: HOSPADM

## 2021-09-23 RX ORDER — DEXAMETHASONE SODIUM PHOSPHATE 4 MG/ML
VIAL (ML) INJECTION AS NEEDED
Status: DISCONTINUED | OUTPATIENT
Start: 2021-09-23 | End: 2021-09-23 | Stop reason: SURG

## 2021-09-23 RX ORDER — HYDROCODONE BITARTRATE AND ACETAMINOPHEN 5; 325 MG/1; MG/1
2 TABLET ORAL AS NEEDED
Status: DISCONTINUED | OUTPATIENT
Start: 2021-09-23 | End: 2021-09-23 | Stop reason: HOSPADM

## 2021-09-23 RX ORDER — HYDROCODONE BITARTRATE AND ACETAMINOPHEN 5; 325 MG/1; MG/1
1 TABLET ORAL AS NEEDED
Status: DISCONTINUED | OUTPATIENT
Start: 2021-09-23 | End: 2021-09-23 | Stop reason: HOSPADM

## 2021-09-23 RX ORDER — ACETAMINOPHEN 325 MG/1
650 TABLET ORAL EVERY 4 HOURS PRN
Status: DISCONTINUED | OUTPATIENT
Start: 2021-09-23 | End: 2021-09-24

## 2021-09-23 RX ORDER — HYDROMORPHONE HYDROCHLORIDE 1 MG/ML
0.5 INJECTION, SOLUTION INTRAMUSCULAR; INTRAVENOUS; SUBCUTANEOUS EVERY 5 MIN PRN
Status: DISCONTINUED | OUTPATIENT
Start: 2021-09-23 | End: 2021-09-23 | Stop reason: HOSPADM

## 2021-09-23 RX ORDER — CEFAZOLIN SODIUM/WATER 2 G/20 ML
2 SYRINGE (ML) INTRAVENOUS ONCE
Status: COMPLETED | OUTPATIENT
Start: 2021-09-23 | End: 2021-09-23

## 2021-09-23 RX ORDER — TRAMADOL HYDROCHLORIDE 50 MG/1
50 TABLET ORAL EVERY 6 HOURS PRN
Status: DISCONTINUED | OUTPATIENT
Start: 2021-09-23 | End: 2021-09-24

## 2021-09-23 RX ORDER — ENALAPRIL MALEATE 5 MG/1
20 TABLET ORAL DAILY
Status: DISCONTINUED | OUTPATIENT
Start: 2021-09-23 | End: 2021-09-24

## 2021-09-23 RX ADMIN — LIDOCAINE HYDROCHLORIDE 50 MG: 10 INJECTION, SOLUTION EPIDURAL; INFILTRATION; INTRACAUDAL; PERINEURAL at 07:34:00

## 2021-09-23 RX ADMIN — DEXAMETHASONE SODIUM PHOSPHATE 4 MG: 4 MG/ML VIAL (ML) INJECTION at 07:37:00

## 2021-09-23 RX ADMIN — ONDANSETRON 4 MG: 2 INJECTION INTRAMUSCULAR; INTRAVENOUS at 09:02:00

## 2021-09-23 RX ADMIN — KETOROLAC TROMETHAMINE 30 MG: 30 INJECTION, SOLUTION INTRAMUSCULAR; INTRAVENOUS at 09:02:00

## 2021-09-23 RX ADMIN — CEFAZOLIN SODIUM/WATER 2 G: 2 G/20 ML SYRINGE (ML) INTRAVENOUS at 07:37:00

## 2021-09-23 RX ADMIN — ROCURONIUM BROMIDE 40 MG: 10 INJECTION, SOLUTION INTRAVENOUS at 07:34:00

## 2021-09-23 RX ADMIN — EPHEDRINE SULFATE 10 MG: 50 INJECTION INTRAVENOUS at 07:51:00

## 2021-09-23 NOTE — ANESTHESIA POSTPROCEDURE EVALUATION
[facility] Patient Status:  Outpatient in a Bed   Age/Gender 74year old female MRN [id number]   Location [address] [facility] Attending Jovan Addison, [address] [facility] Day # 0 PCP MD Carolina Loganl Emperor

## 2021-09-23 NOTE — ANESTHESIA PROCEDURE NOTES
Airway  Date/Time: 9/23/2021 8:09 AM  Urgency: elective      General Information and Staff    Patient location during procedure: OR  Anesthesiologist: Diego Magana MD  Performed: anesthesiologist     Indications and Patient Condition  Indications for a

## 2021-09-23 NOTE — OPERATIVE REPORT
PRE-OP DIAGNOSIS:  vaginal vaulr prolapse; stress incontinence with incomplete bladder emptying    POST-OP DIAGNOSIS:  Same    PROCEDURE:  Repair of enterocele; uterosacral ligament suspension; anterior colporrhaphy; posterior colporrhaphy; midurethral sli epithelium was dissected from the underlying endopelvic connective tissue of the bladder and 0 Vicryl suture was then used to plicate the endopelvic connective tissue, obliterating the cystocele.  The excess vaginal epithelium was excised and the midline in Rectal examination confirmed that none of these sutures had impinged the rectum. 2-0 Vicryl sutures were then used to reapproximate the vaginal epithelium in the midline in a running locking fashion.      Inspection at this point revealed a well-supported

## 2021-09-23 NOTE — ANESTHESIA PREPROCEDURE EVALUATION
PRE-OP EVALUATION    Patient Name: Kavitha Reed    Admit Diagnosis: STRESS INCONTINENCE, VAGINAL VAULT PROLAPSE    Pre-op Diagnosis: STRESS INCONTINENCE, VAGINAL VAULT PROLAPSE    UTEROSACRAL VAGINAL VAULT SUSPENSION, ANTERIOR/ POSTERIOR/ ENTEROCELE  RE 1 capsule by mouth daily. , Disp: , Rfl: , 9/15/2021 at Unknown time  Multiple Vitamins-Minerals (PRESERVISION AREDS) Oral Tab, Take 1 tablet by mouth daily. , Disp: , Rfl: , 9/15/2021  Collagen-Boron-Hyaluronic Acid ([address]) 40-5-3.3 M Alcohol use: Yes      Alcohol/week: 6.0 standard drinks      Types: 6 Glasses of wine per week      Drug use: No     Available pre-op labs reviewed.   Lab Results   Component Value Date    WBC 6.0 09/09/2021    RBC 4.36 09/09/2021    HGB 13.3 09/09/2021

## 2021-09-23 NOTE — PLAN OF CARE
Rec'd pt to rm 0021 s/p uterosacral ligament suspension and repair of enterocele. Pt awake and alert, oriented x 4, very pleasant. VSS, afebrile, denies pain. LS cta, O2 sat 98% on ra, Pt instructed on use of IS, gets up to 2000.  Lyons draining clear, yell

## 2021-09-24 VITALS
OXYGEN SATURATION: 99 % | DIASTOLIC BLOOD PRESSURE: 78 MMHG | BODY MASS INDEX: 25.19 KG/M2 | HEART RATE: 62 BPM | RESPIRATION RATE: 18 BRPM | WEIGHT: 136.88 LBS | TEMPERATURE: 98 F | SYSTOLIC BLOOD PRESSURE: 142 MMHG | HEIGHT: 62 IN

## 2021-09-24 RX ORDER — IBUPROFEN 600 MG/1
600 TABLET ORAL EVERY 6 HOURS PRN
Status: DISCONTINUED | OUTPATIENT
Start: 2021-09-24 | End: 2021-09-24

## 2021-09-24 NOTE — PLAN OF CARE
Assumed care at [address] pt on chair, eating dinner. Tolerated diet. Denies n/v  Very mild perineal pain 0-1/10. On Toradol scheduled. Walked in the hallway. Using IS. Radha pad with old small blood.   Lyons catheter draining yellow colored urine  Discharge integrity remains intact  Description: INTERVENTIONS  - Assess and document risk factors for pressure ulcer development  - Assess and document skin integrity  - Monitor for areas of redness and/or skin breakdown  - Initiate interventions, skin care algorit comfort level or patient's stated pain goal  Description: INTERVENTIONS:  - Encourage pt to monitor pain and request assistance  - Assess pain using appropriate pain scale  - Administer analgesics based on type and severity of pain and evaluate response  -

## 2021-09-24 NOTE — PLAN OF CARE
Plan for discharge today  Lyons cath care/ teaching  Monitor incisions  Pain control, motrin started today  IVSL  Tolerating diet  Awaiting urology

## 2021-09-24 NOTE — PROGRESS NOTES
Pain controlled, tolerating general diet  Abd soft, NT  urine clear in Lyons    POD 1 - doing well    PLAN -  Lyons/leg bag teaching  Home today  F/U 1 week for catheter removal  D/C instructions reviewed

## 2021-09-25 ENCOUNTER — TELEPHONE (OUTPATIENT)
Dept: UROLOGY | Facility: HOSPITAL | Age: 66
End: 2021-09-25

## 2021-09-30 ENCOUNTER — OFFICE VISIT (OUTPATIENT)
Dept: UROLOGY | Facility: HOSPITAL | Age: 66
End: 2021-09-30
Attending: OBSTETRICS & GYNECOLOGY
Payer: MEDICARE

## 2021-09-30 VITALS — RESPIRATION RATE: 18 BRPM | TEMPERATURE: 97 F | SYSTOLIC BLOOD PRESSURE: 128 MMHG | DIASTOLIC BLOOD PRESSURE: 78 MMHG

## 2021-09-30 DIAGNOSIS — R33.9 URINARY RETENTION: Primary | ICD-10-CM

## 2021-09-30 PROCEDURE — 99212 OFFICE O/P EST SF 10 MIN: CPT

## 2021-09-30 RX ORDER — IBUPROFEN 600 MG/1
600 TABLET ORAL EVERY 6 HOURS PRN
COMMUNITY
End: 2021-10-12

## 2021-09-30 NOTE — PROCEDURES
Patient here for voiding trial.   Procedure Date/Name: s/p 9/23/21VAGINAL HYSTERECTOMY, UTEROSACRAL LIGAMENT SUSPENSION, ANTERIOR/ POSTERIOR/ ENTEROCELE REPAIR  CYSTOSCOPY  Doing well no complaints  BMs regular  Using Miralax for bowel mgmt  Pain  Using Ib

## 2021-09-30 NOTE — PATIENT INSTRUCTIONS
Voiding Trial Instructions  You have passed your voiding trial at 9:30 am  Please make sure you are drinking some water today. You can take your Motrin to help with any swelling from the catheter.   It is important to try and empty your bladder every two h

## 2021-10-12 ENCOUNTER — OFFICE VISIT (OUTPATIENT)
Dept: UROLOGY | Facility: HOSPITAL | Age: 66
End: 2021-10-12
Attending: OBSTETRICS & GYNECOLOGY
Payer: MEDICARE

## 2021-10-12 VITALS — BODY MASS INDEX: 25.03 KG/M2 | HEIGHT: 62 IN | WEIGHT: 136 LBS | TEMPERATURE: 97 F

## 2021-10-12 DIAGNOSIS — Z98.890 POST-OPERATIVE STATE: Primary | ICD-10-CM

## 2021-10-12 DIAGNOSIS — N39.41 URGE INCONTINENCE: ICD-10-CM

## 2021-10-12 PROCEDURE — 87086 URINE CULTURE/COLONY COUNT: CPT | Performed by: OBSTETRICS & GYNECOLOGY

## 2021-10-12 PROCEDURE — 81002 URINALYSIS NONAUTO W/O SCOPE: CPT | Performed by: OBSTETRICS & GYNECOLOGY

## 2021-10-12 PROCEDURE — 99212 OFFICE O/P EST SF 10 MIN: CPT

## 2021-10-12 NOTE — PROGRESS NOTES
She is s/p Post-Op Summary  Procedure Date: 09/23/21  Procedure Name: Uterosacral Ligament Suspension;  Anterior/Posterior/Enterocele Repair; Mid-urethral Sling; Cystoscopy  Post-Op Symptoms: UUI  Do you feel your surgery was successful?: Moderately success

## 2021-11-12 ENCOUNTER — TELEPHONE (OUTPATIENT)
Dept: UROLOGY | Facility: HOSPITAL | Age: 66
End: 2021-11-12

## 2021-11-12 NOTE — TELEPHONE ENCOUNTER
Pt phoned RN line saying she tried the estrace cream once this wk and the next morning noted some spotting. Pt tried it again last night and had spotting again this am. LM for pt on VM asking if she could clarify whether she is using the applicator or not.

## 2022-01-11 ENCOUNTER — OFFICE VISIT (OUTPATIENT)
Dept: UROLOGY | Facility: HOSPITAL | Age: 67
End: 2022-01-11
Attending: OBSTETRICS & GYNECOLOGY
Payer: MEDICARE

## 2022-01-11 VITALS — WEIGHT: 133 LBS | HEIGHT: 62 IN | BODY MASS INDEX: 24.48 KG/M2 | TEMPERATURE: 97 F

## 2022-01-11 DIAGNOSIS — N95.2 POSTMENOPAUSAL ATROPHIC VAGINITIS: Primary | ICD-10-CM

## 2022-01-11 DIAGNOSIS — N39.41 URGE INCONTINENCE: ICD-10-CM

## 2022-01-11 DIAGNOSIS — Z98.890 POST-OPERATIVE STATE: ICD-10-CM

## 2022-01-11 DIAGNOSIS — N81.84 PELVIC MUSCLE WASTING: ICD-10-CM

## 2022-01-11 PROCEDURE — 99212 OFFICE O/P EST SF 10 MIN: CPT

## 2022-01-11 NOTE — PROGRESS NOTES
She is s/p Post-Op Summary  Procedure Date: 09/23/21  Procedure Name: Uterosacral Ligament Suspension; Anterior/Posterior/Enterocele Repair;Mid-urethral Sling;Cystoscopy  Post-Op Symptoms: Patient denies pain, LUIS, UUI, prolapse symptoms, nausea/vomitting,

## 2022-04-27 ENCOUNTER — OFFICE VISIT (OUTPATIENT)
Dept: OBGYN CLINIC | Facility: CLINIC | Age: 67
End: 2022-04-27
Payer: MEDICARE

## 2022-04-27 VITALS
SYSTOLIC BLOOD PRESSURE: 102 MMHG | BODY MASS INDEX: 22.49 KG/M2 | DIASTOLIC BLOOD PRESSURE: 72 MMHG | HEIGHT: 65 IN | WEIGHT: 135 LBS

## 2022-04-27 DIAGNOSIS — N81.11 CYSTOCELE, MIDLINE: ICD-10-CM

## 2022-04-27 DIAGNOSIS — Z01.419 WELL WOMAN EXAM WITH ROUTINE GYNECOLOGICAL EXAM: Primary | ICD-10-CM

## 2022-04-27 DIAGNOSIS — Z12.39 ENCOUNTER FOR SCREENING FOR MALIGNANT NEOPLASM OF BREAST, UNSPECIFIED SCREENING MODALITY: ICD-10-CM

## 2022-04-27 DIAGNOSIS — Z12.31 ENCOUNTER FOR SCREENING MAMMOGRAM FOR MALIGNANT NEOPLASM OF BREAST: ICD-10-CM

## 2022-04-27 PROCEDURE — G0101 CA SCREEN;PELVIC/BREAST EXAM: HCPCS | Performed by: OBSTETRICS & GYNECOLOGY

## 2022-06-14 ENCOUNTER — LAB ENCOUNTER (OUTPATIENT)
Dept: LAB | Facility: HOSPITAL | Age: 67
End: 2022-06-14
Attending: INTERNAL MEDICINE
Payer: MEDICARE

## 2022-06-14 DIAGNOSIS — E78.2 MIXED HYPERLIPIDEMIA: ICD-10-CM

## 2022-06-14 DIAGNOSIS — E03.9 ACQUIRED HYPOTHYROIDISM: ICD-10-CM

## 2022-06-14 DIAGNOSIS — I10 ESSENTIAL HYPERTENSION, BENIGN: ICD-10-CM

## 2022-06-14 LAB
ANION GAP SERPL CALC-SCNC: 4 MMOL/L (ref 0–18)
BUN BLD-MCNC: 12 MG/DL (ref 7–18)
CALCIUM BLD-MCNC: 8.9 MG/DL (ref 8.5–10.1)
CHLORIDE SERPL-SCNC: 105 MMOL/L (ref 98–112)
CHOLEST SERPL-MCNC: 182 MG/DL (ref ?–200)
CO2 SERPL-SCNC: 30 MMOL/L (ref 21–32)
CREAT BLD-MCNC: 0.86 MG/DL
FASTING PATIENT LIPID ANSWER: YES
FASTING STATUS PATIENT QL REPORTED: YES
GLUCOSE BLD-MCNC: 99 MG/DL (ref 70–99)
HDLC SERPL-MCNC: 83 MG/DL (ref 40–59)
LDLC SERPL CALC-MCNC: 86 MG/DL (ref ?–100)
NONHDLC SERPL-MCNC: 99 MG/DL (ref ?–130)
OSMOLALITY SERPL CALC.SUM OF ELEC: 288 MOSM/KG (ref 275–295)
POTASSIUM SERPL-SCNC: 4.1 MMOL/L (ref 3.5–5.1)
SODIUM SERPL-SCNC: 139 MMOL/L (ref 136–145)
T4 FREE SERPL-MCNC: 1 NG/DL (ref 0.8–1.7)
TRIGL SERPL-MCNC: 68 MG/DL (ref 30–149)
TSI SER-ACNC: 1.87 MIU/ML (ref 0.36–3.74)
VLDLC SERPL CALC-MCNC: 11 MG/DL (ref 0–30)

## 2022-06-14 PROCEDURE — 80061 LIPID PANEL: CPT

## 2022-06-14 PROCEDURE — 84443 ASSAY THYROID STIM HORMONE: CPT

## 2022-06-14 PROCEDURE — 80048 BASIC METABOLIC PNL TOTAL CA: CPT

## 2022-06-14 PROCEDURE — 84439 ASSAY OF FREE THYROXINE: CPT

## 2022-06-14 PROCEDURE — 36415 COLL VENOUS BLD VENIPUNCTURE: CPT

## 2022-07-12 ENCOUNTER — HOSPITAL ENCOUNTER (OUTPATIENT)
Dept: MAMMOGRAPHY | Facility: HOSPITAL | Age: 67
Discharge: HOME | End: 2022-07-12
Attending: OBSTETRICS & GYNECOLOGY
Payer: MEDICARE

## 2022-07-12 DIAGNOSIS — Z12.31 ENCOUNTER FOR SCREENING MAMMOGRAM FOR MALIGNANT NEOPLASM OF BREAST: ICD-10-CM

## 2022-07-12 DIAGNOSIS — Z12.39 ENCOUNTER FOR SCREENING FOR MALIGNANT NEOPLASM OF BREAST, UNSPECIFIED SCREENING MODALITY: ICD-10-CM

## 2022-07-12 PROCEDURE — 77063 BREAST TOMOSYNTHESIS BI: CPT | Performed by: OBSTETRICS & GYNECOLOGY

## 2022-07-12 PROCEDURE — 77067 SCR MAMMO BI INCL CAD: CPT | Performed by: OBSTETRICS & GYNECOLOGY

## 2022-07-13 ENCOUNTER — HOSPITAL ENCOUNTER (OUTPATIENT)
Dept: ULTRASOUND IMAGING | Age: 67
Discharge: HOME | End: 2022-07-13
Attending: INTERNAL MEDICINE
Payer: MEDICARE

## 2022-07-13 DIAGNOSIS — Z13.6 SCREENING FOR AAA (ABDOMINAL AORTIC ANEURYSM): ICD-10-CM

## 2022-07-13 DIAGNOSIS — E78.2 MIXED HYPERLIPIDEMIA: ICD-10-CM

## 2022-07-13 DIAGNOSIS — Z82.49 FAMILY HISTORY OF AORTIC ANEURYSM: ICD-10-CM

## 2022-07-13 DIAGNOSIS — I10 ESSENTIAL HYPERTENSION, BENIGN: ICD-10-CM

## 2022-07-13 PROCEDURE — 76770 US EXAM ABDO BACK WALL COMP: CPT | Performed by: INTERNAL MEDICINE

## 2022-07-20 ENCOUNTER — LAB ENCOUNTER (OUTPATIENT)
Dept: LAB | Age: 67
End: 2022-07-20
Attending: OBSTETRICS & GYNECOLOGY
Payer: MEDICARE

## 2022-07-20 ENCOUNTER — TELEPHONE (OUTPATIENT)
Dept: UROLOGY | Facility: CLINIC | Age: 67
End: 2022-07-20

## 2022-07-20 DIAGNOSIS — R39.9 UTI SYMPTOMS: Primary | ICD-10-CM

## 2022-07-20 DIAGNOSIS — R39.9 UTI SYMPTOMS: ICD-10-CM

## 2022-07-20 PROCEDURE — 87086 URINE CULTURE/COLONY COUNT: CPT

## 2022-07-20 RX ORDER — NITROFURANTOIN 25; 75 MG/1; MG/1
100 CAPSULE ORAL 2 TIMES DAILY
Qty: 14 CAPSULE | Refills: 0 | Status: SHIPPED | OUTPATIENT
Start: 2022-07-20

## 2022-07-20 NOTE — TELEPHONE ENCOUNTER
TC from pt w/ c/o urgency and dysuria. Afebrile. Pt tested urine w/ AZO test strip and it came back showing leukocytes. Plan for pt to get urine specimen at [facility] lab today. DANETTE Sellers-macrobid 100mg po BID x 7d.

## 2022-07-22 ENCOUNTER — TELEPHONE (OUTPATIENT)
Dept: UROLOGY | Facility: CLINIC | Age: 67
End: 2022-07-22

## 2022-07-22 NOTE — TELEPHONE ENCOUNTER
TC to patient to report urine culture findings:  URINE CULTURE 10,000 - 50,000 CFU/ML Gram positive babita    No further workup, Susceptibility not clinically indicated        Pt states her symptoms are improved with use of NTF which was started empirically. Further reports that she wasn't using estrogen last 2 weeks as she was on cruise in [place]. Feels her \"burning\" was vaginal in nature and reiterated importance of maintaining estrogen therapy as prescribed to prevent UTI and promote vaginal health.

## 2022-08-17 ENCOUNTER — TELEPHONE (OUTPATIENT)
Dept: UROLOGY | Facility: CLINIC | Age: 67
End: 2022-08-17

## 2022-08-17 NOTE — TELEPHONE ENCOUNTER
Pt continues to c/o vaginal burning for over a week, using estrace as directed and has tried vaginal lubricants but now itching at vaginal opening and entire vulva.   To be seen 8/18 with Lety SURESH

## 2022-08-18 ENCOUNTER — OFFICE VISIT (OUTPATIENT)
Dept: UROLOGY | Facility: CLINIC | Age: 67
End: 2022-08-18
Attending: OBSTETRICS & GYNECOLOGY
Payer: MEDICARE

## 2022-08-18 VITALS — BODY MASS INDEX: 22.49 KG/M2 | HEIGHT: 65 IN | TEMPERATURE: 98 F | WEIGHT: 135 LBS

## 2022-08-18 DIAGNOSIS — N94.10 DYSPAREUNIA, FEMALE: ICD-10-CM

## 2022-08-18 DIAGNOSIS — R39.15 URINARY URGENCY: ICD-10-CM

## 2022-08-18 DIAGNOSIS — N89.8 VAGINAL DISCHARGE: Primary | ICD-10-CM

## 2022-08-18 DIAGNOSIS — N89.8 VAGINAL IRRITATION: ICD-10-CM

## 2022-08-18 PROCEDURE — 87510 GARDNER VAG DNA DIR PROBE: CPT | Performed by: STUDENT IN AN ORGANIZED HEALTH CARE EDUCATION/TRAINING PROGRAM

## 2022-08-18 PROCEDURE — 87660 TRICHOMONAS VAGIN DIR PROBE: CPT | Performed by: STUDENT IN AN ORGANIZED HEALTH CARE EDUCATION/TRAINING PROGRAM

## 2022-08-18 PROCEDURE — 87480 CANDIDA DNA DIR PROBE: CPT | Performed by: STUDENT IN AN ORGANIZED HEALTH CARE EDUCATION/TRAINING PROGRAM

## 2022-08-19 ENCOUNTER — TELEPHONE (OUTPATIENT)
Dept: UROLOGY | Facility: CLINIC | Age: 67
End: 2022-08-19

## 2022-08-19 DIAGNOSIS — B37.3 VAGINAL CANDIDA: Primary | ICD-10-CM

## 2022-08-19 RX ORDER — METRONIDAZOLE 7.5 MG/G
5 GEL VAGINAL NIGHTLY
Qty: 5 G | Refills: 0 | Status: SHIPPED | OUTPATIENT
Start: 2022-08-19

## 2022-08-19 RX ORDER — FLUCONAZOLE 150 MG/1
150 TABLET ORAL ONCE
Qty: 1 TABLET | Refills: 0 | Status: SHIPPED | OUTPATIENT
Start: 2022-08-19 | End: 2022-08-19

## 2022-08-19 RX ORDER — FLUCONAZOLE 150 MG/1
TABLET ORAL
Qty: 2 TABLET | Refills: 0 | Status: SHIPPED | OUTPATIENT
Start: 2022-08-19

## 2022-08-19 NOTE — TELEPHONE ENCOUNTER
TC to patient to inform of BV infection from swab results and Yeast infection. Informed of required regime with meds/application and frequency of both.   Pt voices understanding

## 2022-09-27 ENCOUNTER — OFFICE VISIT (OUTPATIENT)
Dept: UROLOGY | Facility: CLINIC | Age: 67
End: 2022-09-27
Attending: OBSTETRICS & GYNECOLOGY

## 2022-09-27 VITALS — BODY MASS INDEX: 22.49 KG/M2 | TEMPERATURE: 97 F | WEIGHT: 135 LBS | HEIGHT: 65 IN

## 2022-09-27 DIAGNOSIS — N94.10 DYSPAREUNIA, FEMALE: ICD-10-CM

## 2022-09-27 DIAGNOSIS — N81.84 PELVIC MUSCLE WASTING: ICD-10-CM

## 2022-09-27 DIAGNOSIS — N95.2 POSTMENOPAUSAL ATROPHIC VAGINITIS: Primary | ICD-10-CM

## 2022-09-27 DIAGNOSIS — Z98.890 POST-OPERATIVE STATE: ICD-10-CM

## 2022-09-27 PROCEDURE — 99212 OFFICE O/P EST SF 10 MIN: CPT

## 2022-09-27 RX ORDER — ALENDRONATE SODIUM 70 MG/1
70 TABLET ORAL
COMMUNITY

## 2022-10-31 DIAGNOSIS — N95.2 POSTMENOPAUSAL ATROPHIC VAGINITIS: ICD-10-CM

## 2022-11-01 RX ORDER — ESTRADIOL 0.1 MG/G
CREAM VAGINAL
Qty: 42.5 G | Refills: 3 | Status: SHIPPED | OUTPATIENT
Start: 2022-11-01

## 2022-12-09 PROBLEM — Z86.0101 HISTORY OF ADENOMATOUS POLYP OF COLON: Status: ACTIVE | Noted: 2022-12-09

## 2022-12-09 PROBLEM — Z86.010 HISTORY OF ADENOMATOUS POLYP OF COLON: Status: ACTIVE | Noted: 2022-12-09

## 2023-03-18 ENCOUNTER — APPOINTMENT (OUTPATIENT)
Dept: GENERAL RADIOLOGY | Age: 68
End: 2023-03-18
Attending: PHYSICIAN ASSISTANT
Payer: MEDICARE

## 2023-03-18 ENCOUNTER — HOSPITAL ENCOUNTER (OUTPATIENT)
Age: 68
Discharge: HOME | End: 2023-03-18
Payer: MEDICARE

## 2023-03-18 VITALS
DIASTOLIC BLOOD PRESSURE: 88 MMHG | TEMPERATURE: 98 F | HEART RATE: 84 BPM | SYSTOLIC BLOOD PRESSURE: 147 MMHG | OXYGEN SATURATION: 98 % | RESPIRATION RATE: 16 BRPM
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
## 2023-03-18 DIAGNOSIS — S69.92XA INJURY OF LEFT HAND, INITIAL ENCOUNTER: ICD-10-CM

## 2023-03-18 DIAGNOSIS — S62.101A CLOSED FRACTURE OF RIGHT WRIST, INITIAL ENCOUNTER: ICD-10-CM

## 2023-03-18 PROCEDURE — 73130 X-RAY EXAM OF HAND: CPT | Performed by: PHYSICIAN ASSISTANT

## 2023-03-18 PROCEDURE — 73110 X-RAY EXAM OF WRIST: CPT | Performed by: PHYSICIAN ASSISTANT

## 2023-03-18 PROCEDURE — 29125 APPL SHORT ARM SPLINT STATIC: CPT | Performed by: PHYSICIAN ASSISTANT

## 2023-03-18 PROCEDURE — L3924 HFO WITHOUT JOINTS PRE OTS: HCPCS | Performed by: PHYSICIAN ASSISTANT

## 2023-03-18 PROCEDURE — 99203 OFFICE O/P NEW LOW 30 MIN: CPT | Performed by: PHYSICIAN ASSISTANT

## 2023-03-18 PROCEDURE — 96372 THER/PROPH/DIAG INJ SC/IM: CPT | Performed by: PHYSICIAN ASSISTANT

## 2023-03-18 RX ORDER — KETOROLAC TROMETHAMINE 30 MG/ML
30 INJECTION, SOLUTION INTRAMUSCULAR; INTRAVENOUS ONCE
Status: COMPLETED | OUTPATIENT
Start: 2023-03-18 | End: 2023-03-18

## 2023-03-18 NOTE — DISCHARGE INSTRUCTIONS
Please return to the ER/clinic if symptoms worsen. Follow-up with your PCP in 24-48 hours as needed. Wear the postmold as provided: rest ice compression elevation. Also wear your Velcro wrist splint. Call orthopedics as soon as possible to schedule an appointment for further evaluation and treatment. Motrin and/or Tylenol for discomfort.

## 2023-03-18 NOTE — ED INITIAL ASSESSMENT (HPI)
Tripped well bowling last noc, tried to stop fall today pain & swelling to right wrist & left thumb. Denies head or any other injuries. Motrin PTA, ice pack provided.

## 2023-03-20 ENCOUNTER — TELEPHONE (OUTPATIENT)
Dept: ORTHOPEDICS CLINIC | Facility: CLINIC | Age: 68
End: 2023-03-20

## 2023-03-20 NOTE — TELEPHONE ENCOUNTER
Future Appointments   Date Time Provider Pramod Bettie   3/21/2023 11:00 AM Jaci Romberg, [place] [facility] [place]   5/16/2023  1:00 PM Catrachito Zarate MD [facility] [place]   9/26/2023  1:20 PM DO JADE Valero

## 2023-03-20 NOTE — TELEPHONE ENCOUNTER
Right wrist fracture. Please let us know when Dr. Hansel Damian or Gopal Hemphill can see this patient.

## 2023-03-20 NOTE — TELEPHONE ENCOUNTER
Please advise when this patient can be seen, who's schedule to add her to, and if new imaging would be needed. Thank you!

## 2023-03-21 ENCOUNTER — OFFICE VISIT (OUTPATIENT)
Dept: ORTHOPEDICS CLINIC | Facility: CLINIC | Age: 68
End: 2023-03-21
Payer: MEDICARE

## 2023-03-21 VITALS — WEIGHT: 135 LBS | BODY MASS INDEX: 22.49 KG/M2 | HEIGHT: 65 IN

## 2023-03-21 DIAGNOSIS — S52.551A OTHER CLOSED EXTRA-ARTICULAR FRACTURE OF DISTAL END OF RIGHT RADIUS, INITIAL ENCOUNTER: Primary | ICD-10-CM

## 2023-03-21 PROCEDURE — 99213 OFFICE O/P EST LOW 20 MIN: CPT | Performed by: PHYSICIAN ASSISTANT

## 2023-04-20 ENCOUNTER — HOSPITAL ENCOUNTER (OUTPATIENT)
Dept: GENERAL RADIOLOGY | Age: 68
Discharge: HOME | End: 2023-04-20
Attending: PHYSICIAN ASSISTANT
Payer: MEDICARE

## 2023-04-20 ENCOUNTER — OFFICE VISIT (OUTPATIENT)
Dept: ORTHOPEDICS CLINIC | Facility: CLINIC | Age: 68
End: 2023-04-20
Payer: MEDICARE

## 2023-04-20 VITALS — BODY MASS INDEX: 22.49 KG/M2 | WEIGHT: 135 LBS | HEIGHT: 65 IN

## 2023-04-20 DIAGNOSIS — S52.551A OTHER CLOSED EXTRA-ARTICULAR FRACTURE OF DISTAL END OF RIGHT RADIUS, INITIAL ENCOUNTER: Primary | ICD-10-CM

## 2023-04-20 DIAGNOSIS — S52.551A OTHER CLOSED EXTRA-ARTICULAR FRACTURE OF DISTAL END OF RIGHT RADIUS, INITIAL ENCOUNTER: ICD-10-CM

## 2023-04-20 PROCEDURE — 73110 X-RAY EXAM OF WRIST: CPT | Performed by: PHYSICIAN ASSISTANT

## 2023-04-20 PROCEDURE — 99213 OFFICE O/P EST LOW 20 MIN: CPT | Performed by: PHYSICIAN ASSISTANT

## 2023-05-08 ENCOUNTER — OFFICE VISIT (OUTPATIENT)
Dept: OCCUPATIONAL MEDICINE | Facility: HOSPITAL | Age: 68
End: 2023-05-08
Attending: PHYSICIAN ASSISTANT
Payer: MEDICARE

## 2023-05-08 DIAGNOSIS — S52.551A OTHER CLOSED EXTRA-ARTICULAR FRACTURE OF DISTAL END OF RIGHT RADIUS, INITIAL ENCOUNTER: ICD-10-CM

## 2023-05-08 PROCEDURE — 97165 OT EVAL LOW COMPLEX 30 MIN: CPT

## 2023-05-08 PROCEDURE — 97110 THERAPEUTIC EXERCISES: CPT

## 2023-05-12 ENCOUNTER — OFFICE VISIT (OUTPATIENT)
Dept: OCCUPATIONAL MEDICINE | Facility: HOSPITAL | Age: 68
End: 2023-05-12
Attending: PHYSICIAN ASSISTANT
Payer: MEDICARE

## 2023-05-12 PROCEDURE — 97140 MANUAL THERAPY 1/> REGIONS: CPT

## 2023-05-12 PROCEDURE — 97110 THERAPEUTIC EXERCISES: CPT

## 2023-05-15 ENCOUNTER — OFFICE VISIT (OUTPATIENT)
Dept: OCCUPATIONAL MEDICINE | Facility: HOSPITAL | Age: 68
End: 2023-05-15
Attending: PHYSICIAN ASSISTANT
Payer: MEDICARE

## 2023-05-15 PROCEDURE — 97140 MANUAL THERAPY 1/> REGIONS: CPT

## 2023-05-15 PROCEDURE — 97110 THERAPEUTIC EXERCISES: CPT

## 2023-05-16 ENCOUNTER — OFFICE VISIT (OUTPATIENT)
Dept: OBGYN CLINIC | Facility: CLINIC | Age: 68
End: 2023-05-16
Payer: MEDICARE

## 2023-05-16 VITALS
HEART RATE: 62 BPM | HEIGHT: 65 IN | SYSTOLIC BLOOD PRESSURE: 122 MMHG | BODY MASS INDEX: 22.86 KG/M2 | DIASTOLIC BLOOD PRESSURE: 78 MMHG | WEIGHT: 137.19 LBS

## 2023-05-16 DIAGNOSIS — Z12.4 SCREENING FOR MALIGNANT NEOPLASM OF CERVIX: ICD-10-CM

## 2023-05-16 DIAGNOSIS — Z01.419 WELL WOMAN EXAM WITH ROUTINE GYNECOLOGICAL EXAM: Primary | ICD-10-CM

## 2023-05-16 DIAGNOSIS — Z12.31 ENCOUNTER FOR SCREENING MAMMOGRAM FOR MALIGNANT NEOPLASM OF BREAST: ICD-10-CM

## 2023-05-16 PROCEDURE — G0101 CA SCREEN;PELVIC/BREAST EXAM: HCPCS | Performed by: OBSTETRICS & GYNECOLOGY

## 2023-05-17 ENCOUNTER — OFFICE VISIT (OUTPATIENT)
Dept: OCCUPATIONAL MEDICINE | Facility: HOSPITAL | Age: 68
End: 2023-05-17
Attending: PHYSICIAN ASSISTANT
Payer: MEDICARE

## 2023-05-17 PROCEDURE — 97110 THERAPEUTIC EXERCISES: CPT

## 2023-05-17 PROCEDURE — 97140 MANUAL THERAPY 1/> REGIONS: CPT

## 2023-05-22 ENCOUNTER — OFFICE VISIT (OUTPATIENT)
Dept: OCCUPATIONAL MEDICINE | Facility: HOSPITAL | Age: 68
End: 2023-05-22
Attending: PHYSICIAN ASSISTANT
Payer: MEDICARE

## 2023-05-22 PROCEDURE — 97140 MANUAL THERAPY 1/> REGIONS: CPT

## 2023-05-22 PROCEDURE — 97110 THERAPEUTIC EXERCISES: CPT

## 2023-05-24 ENCOUNTER — OFFICE VISIT (OUTPATIENT)
Dept: OCCUPATIONAL MEDICINE | Facility: HOSPITAL | Age: 68
End: 2023-05-24
Attending: PHYSICIAN ASSISTANT
Payer: MEDICARE

## 2023-05-24 PROCEDURE — 97035 APP MDLTY 1+ULTRASOUND EA 15: CPT

## 2023-05-24 PROCEDURE — 97140 MANUAL THERAPY 1/> REGIONS: CPT

## 2023-05-24 PROCEDURE — 97110 THERAPEUTIC EXERCISES: CPT

## 2023-05-25 ENCOUNTER — OFFICE VISIT (OUTPATIENT)
Dept: ORTHOPEDICS CLINIC | Facility: CLINIC | Age: 68
End: 2023-05-25
Payer: MEDICARE

## 2023-05-25 VITALS — BODY MASS INDEX: 22.82 KG/M2 | HEIGHT: 65 IN | WEIGHT: 137 LBS

## 2023-05-25 DIAGNOSIS — S52.551A OTHER CLOSED EXTRA-ARTICULAR FRACTURE OF DISTAL END OF RIGHT RADIUS, INITIAL ENCOUNTER: Primary | ICD-10-CM

## 2023-05-25 PROCEDURE — 99213 OFFICE O/P EST LOW 20 MIN: CPT | Performed by: PHYSICIAN ASSISTANT

## 2023-05-30 ENCOUNTER — OFFICE VISIT (OUTPATIENT)
Dept: OCCUPATIONAL MEDICINE | Facility: HOSPITAL | Age: 68
End: 2023-05-30
Attending: PHYSICIAN ASSISTANT
Payer: MEDICARE

## 2023-05-30 PROCEDURE — 97110 THERAPEUTIC EXERCISES: CPT

## 2023-05-30 PROCEDURE — 97035 APP MDLTY 1+ULTRASOUND EA 15: CPT

## 2023-06-01 ENCOUNTER — OFFICE VISIT (OUTPATIENT)
Dept: OCCUPATIONAL MEDICINE | Facility: HOSPITAL | Age: 68
End: 2023-06-01
Attending: PHYSICIAN ASSISTANT
Payer: MEDICARE

## 2023-06-01 PROCEDURE — 97035 APP MDLTY 1+ULTRASOUND EA 15: CPT

## 2023-06-01 PROCEDURE — 97110 THERAPEUTIC EXERCISES: CPT

## 2023-06-01 PROCEDURE — 97140 MANUAL THERAPY 1/> REGIONS: CPT

## 2023-06-05 ENCOUNTER — OFFICE VISIT (OUTPATIENT)
Dept: OCCUPATIONAL MEDICINE | Facility: HOSPITAL | Age: 68
End: 2023-06-05
Attending: INTERNAL MEDICINE
Payer: MEDICARE

## 2023-06-05 PROCEDURE — 97035 APP MDLTY 1+ULTRASOUND EA 15: CPT

## 2023-06-05 PROCEDURE — 97110 THERAPEUTIC EXERCISES: CPT

## 2023-06-05 PROCEDURE — 97140 MANUAL THERAPY 1/> REGIONS: CPT

## 2023-06-09 ENCOUNTER — OFFICE VISIT (OUTPATIENT)
Dept: OCCUPATIONAL MEDICINE | Facility: HOSPITAL | Age: 68
End: 2023-06-09
Attending: INTERNAL MEDICINE
Payer: MEDICARE

## 2023-06-09 PROCEDURE — 97140 MANUAL THERAPY 1/> REGIONS: CPT

## 2023-06-09 PROCEDURE — 97110 THERAPEUTIC EXERCISES: CPT

## 2023-06-09 PROCEDURE — 97035 APP MDLTY 1+ULTRASOUND EA 15: CPT

## 2023-06-14 ENCOUNTER — OFFICE VISIT (OUTPATIENT)
Dept: OCCUPATIONAL MEDICINE | Facility: HOSPITAL | Age: 68
End: 2023-06-14
Attending: INTERNAL MEDICINE
Payer: MEDICARE

## 2023-06-14 PROCEDURE — 97530 THERAPEUTIC ACTIVITIES: CPT

## 2023-06-14 PROCEDURE — 97110 THERAPEUTIC EXERCISES: CPT

## 2023-06-16 ENCOUNTER — OFFICE VISIT (OUTPATIENT)
Dept: OCCUPATIONAL MEDICINE | Facility: HOSPITAL | Age: 68
End: 2023-06-16
Attending: INTERNAL MEDICINE
Payer: MEDICARE

## 2023-06-16 PROCEDURE — 97110 THERAPEUTIC EXERCISES: CPT

## 2023-06-19 ENCOUNTER — OFFICE VISIT (OUTPATIENT)
Dept: OCCUPATIONAL MEDICINE | Facility: HOSPITAL | Age: 68
End: 2023-06-19
Attending: INTERNAL MEDICINE
Payer: MEDICARE

## 2023-06-19 PROCEDURE — 97530 THERAPEUTIC ACTIVITIES: CPT

## 2023-06-19 PROCEDURE — 97110 THERAPEUTIC EXERCISES: CPT

## 2023-06-21 ENCOUNTER — OFFICE VISIT (OUTPATIENT)
Dept: OCCUPATIONAL MEDICINE | Facility: HOSPITAL | Age: 68
End: 2023-06-21
Attending: INTERNAL MEDICINE
Payer: MEDICARE

## 2023-06-21 PROCEDURE — 97110 THERAPEUTIC EXERCISES: CPT

## 2023-06-22 ENCOUNTER — OFFICE VISIT (OUTPATIENT)
Dept: ORTHOPEDICS CLINIC | Facility: CLINIC | Age: 68
End: 2023-06-22
Payer: MEDICARE

## 2023-06-22 VITALS — WEIGHT: 137 LBS | BODY MASS INDEX: 22.82 KG/M2 | HEIGHT: 65 IN

## 2023-06-22 DIAGNOSIS — S52.551A OTHER CLOSED EXTRA-ARTICULAR FRACTURE OF DISTAL END OF RIGHT RADIUS, INITIAL ENCOUNTER: Primary | ICD-10-CM

## 2023-06-22 PROCEDURE — 99213 OFFICE O/P EST LOW 20 MIN: CPT | Performed by: PHYSICIAN ASSISTANT

## 2023-06-23 ENCOUNTER — HOSPITAL ENCOUNTER (OUTPATIENT)
Dept: MRI IMAGING | Facility: HOSPITAL | Age: 68
Discharge: HOME | End: 2023-06-23
Attending: PHYSICIAN ASSISTANT
Payer: MEDICARE

## 2023-06-23 DIAGNOSIS — S52.551A OTHER CLOSED EXTRA-ARTICULAR FRACTURE OF DISTAL END OF RIGHT RADIUS, INITIAL ENCOUNTER: ICD-10-CM

## 2023-06-23 PROCEDURE — 73221 MRI JOINT UPR EXTREM W/O DYE: CPT | Performed by: PHYSICIAN ASSISTANT

## 2023-06-26 ENCOUNTER — OFFICE VISIT (OUTPATIENT)
Dept: OCCUPATIONAL MEDICINE | Facility: HOSPITAL | Age: 68
End: 2023-06-26
Attending: INTERNAL MEDICINE
Payer: MEDICARE

## 2023-06-26 PROCEDURE — 97110 THERAPEUTIC EXERCISES: CPT

## 2023-06-26 NOTE — PROGRESS NOTES
Diagnosis: Other closed extra-articular fracture of distal end of right radius, initial encounter (Q11.837E)      Referring Provider: Abelardo Devine  Date of Evaluation:    5/8/2023    Precautions:  None Next MD visit:   5/22/2023  Date of Surgery: n/a   Insurance Primary/Secondary: MEDICARE / Amanda Chandler     # Auth Visits: 15/16       Subjective: Patient presents to OT appt today noting that she has been completing HEP exercises. Patient completing yoga and daily activities. Patient had MRI last Friday d/t dorsal R hand nodules. Patient noted with some stiffness to R wrist but overall feeling well. Objective:     CIRCUMFERENTIAL EDEMA (cm):  Wrist Crease: R=15.7cm; L=14.6 cm     AROM (degrees): Pt BUE AROM WFL for elbow, wrist, and digits except noted below  Elbow Wrist   Supination: R 90, L 90  Pronation: R 90, L 90 Flexion: R 60, L 75  Extension: R 50, L 78  Ulnar Deviation: R 35, L 35  Radial Deviation R 15, L 20      Strength (lbs) Right Average Left Average   : 25.0 lbs 31.0 lbs   2 pt Pinch: 3.0 lbs 5.0 lbs   3 pt Pinch: 4.0 lbs 6.0 lbs   Lateral Pinch: 10.0 lbs 12.0 lbs      SPECIAL TESTS:   Nine-Hole Peg Test: R=22.6 sec; L=19.9 sec    Assessment: Patient progressing overall with therapy goals in terms of ROM, strengthening, and coordination training. Patient would be appropriate for 1 additional visit in OT prior to discharge. Goals: (to be met in 8 visits)  1. Patient will demonstrate at least 60 degrees or greater R wrist flexion indicating increased ability to manage functional ADL/IADLs. MET  2. Patient will demonstrate at least 60 degrees or greater R wrist extension indicating increased ability to manage functional ADL/IADLs. 3. Patient will demonstrateat at least 20.0 lbs or greater R  strength indicating increased ability to manage functional ADL/IADLs. MET  4.  Patient will be independent and compliant with swelling management techniques to facilitate improved daily functioning with use of RUE. MET  5. Patient will be independent and compliant with comprehensive HEP to maintain progress achieved in OT. MET  6. Patient will demonstrate at least equilateral 75 degrees or greater R wrist flexion indicating increased ability to manage functional ADL/IADLs. 7. Patient will demonstrate at least 20.0 sec or less for R Nine-Hole Peg Test score indicating increased ability to manage functional ADL/IADLs. 8. Patient will demonstrate 4+/5 or greater overall R wrist flexion/extension strength indicating increased ability to return to leisure activities such as bowling. Plan: Patient will continue to be seen for 2x/week or a total of 16 visits over a 90 day period.   Treatment will include: Manual Therapy, Neuromuscular Re-education, Self-Care Home Management, Therapeutic Activities, Therapeutic Exercise and Home Exercise Program instruction, Modalities PRN      Date: 6/16/2023  TX#: 12/16 Date: 6/19/2023  TX#: 13/16 Date: 6/21/2023  TX#: 14/16 Date: 6/26/2023  TX#: 15/16   -5 min hot pack to R hand  -PROM/AAROM stretching for R wrist and digits  -Green Flexbar Exercises for eccentric pronation, supination, wrist flexion, wrist extension, clockwise turn, counterclockwise turn 2 sets of 10 reps each  -Blue Digiflex Full  2 sets of 10 reps each 5 sec hold; Red Digiflex Individual Digit 1 set of 10 reps each  -Green Theraband Exercises for shoulder blade squeeze, bicep curls, tricep extension, forward reaching  -5 min hot pack to R hand  -PROM/AAROM stretching for R wrist and digits  -Green Flexbar Exercises for eccentric pronation, supination, wrist flexion, wrist extension, clockwise turn, counterclockwise turn 2 sets of 10 reps each  -Blue Digiflex Full  2 sets of 10 reps each 5 sec hold; Red Digiflex Individual Digit 1 set of 10 reps each  -Perfection FMC/In-Hand Manipulation and coordination sorting activity -5 min hot pack to R hand  -PROM/AAROM stretching for R wrist and digits  -Lata Ly Flexbar Exercises for eccentric pronation, supination, wrist flexion, wrist extension, clockwise turn, counterclockwise turn 2 sets of 10 reps each  -Blue Digiflex Full  2 sets of 10 reps each 5 sec hold; Red Digiflex Individual Digit 1 set of 10 reps each  -3 lb Weight Graded Eccentric Wrist Strengthening Exercises -5 min hot pack to R hand  -PROM/AAROM stretching for R wrist and digits  -Re-Assessment of Object Measures  -Magda Faulknerreys Exercises for eccentric pronation, supination, wrist flexion, wrist extension, clockwise turn, counterclockwise turn 2 sets of 10 reps each  -Blue Digiflex Full  2 sets of 10 reps each 5 sec hold; Red Digiflex Individual Digit 1 set of 10 reps each  -3 lb Weight Graded Eccentric Wrist Strengthening Exercises  -Grey Digit Extension Glove 1 set of 10 reps each  -Red  Clip 3-Point Pinch Chinese  Dalton Sorting Activity     HEP:   -Wrist AAROM/PROM Exercises  -Red Theraputty Strengthening Exercises  -3 lb Weight Wrist Strengthening Exercises    Charges: TEx3   Total Timed Treatment: 45 min  Total Treatment Time: 45 min

## 2023-06-28 ENCOUNTER — OFFICE VISIT (OUTPATIENT)
Dept: OCCUPATIONAL MEDICINE | Facility: HOSPITAL | Age: 68
End: 2023-06-28
Attending: INTERNAL MEDICINE
Payer: MEDICARE

## 2023-06-28 PROCEDURE — 97110 THERAPEUTIC EXERCISES: CPT

## 2023-07-10 ENCOUNTER — TELEPHONE (OUTPATIENT)
Dept: ORTHOPEDICS CLINIC | Facility: CLINIC | Age: 68
End: 2023-07-10

## 2023-07-10 NOTE — TELEPHONE ENCOUNTER
Patient called requesting to make an appt for MRI TR. Please advise when we can fit patient in since next available is 7/31.

## 2023-07-12 NOTE — TELEPHONE ENCOUNTER
Noemi Alanis, [address] [facility] Rasheeda  [facility] [place] hours ago (12:06 PM)     I can see her Thursday or next week Monday or Thursday    Batool Torres       Please see Anali's message regarding when to schedule patient for MRI test results. Thanks!

## 2023-07-12 NOTE — TELEPHONE ENCOUNTER
Scheduled.    Future Appointments   Date Time Provider Pramod Alexandre   7/17/2023  2:00 PM [facility] [id number]   8/8/2023 11:20 AM [address] [facility] [address] [facility]   9/26/2023  1:20 PM DO JADE Castellon

## 2023-07-17 ENCOUNTER — OFFICE VISIT (OUTPATIENT)
Dept: ORTHOPEDICS CLINIC | Facility: CLINIC | Age: 68
End: 2023-07-17

## 2023-07-17 VITALS — WEIGHT: 137 LBS | HEIGHT: 65 IN | BODY MASS INDEX: 22.82 KG/M2

## 2023-07-17 DIAGNOSIS — M77.8 WRIST TENDONITIS: ICD-10-CM

## 2023-07-17 DIAGNOSIS — S52.551A OTHER CLOSED EXTRA-ARTICULAR FRACTURE OF DISTAL END OF RIGHT RADIUS, INITIAL ENCOUNTER: Primary | ICD-10-CM

## 2023-07-17 PROCEDURE — 20550 NJX 1 TENDON SHEATH/LIGAMENT: CPT | Performed by: ORTHOPAEDIC SURGERY

## 2023-07-17 PROCEDURE — 99213 OFFICE O/P EST LOW 20 MIN: CPT | Performed by: ORTHOPAEDIC SURGERY

## 2023-07-17 RX ORDER — BETAMETHASONE SODIUM PHOSPHATE AND BETAMETHASONE ACETATE 3; 3 MG/ML; MG/ML
6 INJECTION, SUSPENSION INTRA-ARTICULAR; INTRALESIONAL; INTRAMUSCULAR; SOFT TISSUE ONCE
Status: COMPLETED | OUTPATIENT
Start: 2023-07-17 | End: 2023-07-17

## 2023-07-17 RX ADMIN — BETAMETHASONE SODIUM PHOSPHATE AND BETAMETHASONE ACETATE 6 MG: 3; 3 INJECTION, SUSPENSION INTRA-ARTICULAR; INTRALESIONAL; INTRAMUSCULAR; SOFT TISSUE at 14:39:00

## 2023-08-08 ENCOUNTER — HOSPITAL ENCOUNTER (OUTPATIENT)
Dept: MAMMOGRAPHY | Facility: HOSPITAL | Age: 68
Discharge: HOME | End: 2023-08-08
Attending: OBSTETRICS & GYNECOLOGY
Payer: MEDICARE

## 2023-08-08 DIAGNOSIS — Z12.31 ENCOUNTER FOR SCREENING MAMMOGRAM FOR MALIGNANT NEOPLASM OF BREAST: ICD-10-CM

## 2023-08-08 PROCEDURE — 77063 BREAST TOMOSYNTHESIS BI: CPT | Performed by: OBSTETRICS & GYNECOLOGY

## 2023-08-08 PROCEDURE — 77067 SCR MAMMO BI INCL CAD: CPT | Performed by: OBSTETRICS & GYNECOLOGY

## 2023-08-21 ENCOUNTER — OFFICE VISIT (OUTPATIENT)
Dept: ORTHOPEDICS CLINIC | Facility: CLINIC | Age: 68
End: 2023-08-21
Payer: MEDICARE

## 2023-08-21 VITALS — BODY MASS INDEX: 25.21 KG/M2 | WEIGHT: 137 LBS | HEIGHT: 62 IN

## 2023-08-21 DIAGNOSIS — M77.8 WRIST TENDONITIS: ICD-10-CM

## 2023-08-21 DIAGNOSIS — S52.551A OTHER CLOSED EXTRA-ARTICULAR FRACTURE OF DISTAL END OF RIGHT RADIUS, INITIAL ENCOUNTER: Primary | ICD-10-CM

## 2023-08-21 PROCEDURE — 99213 OFFICE O/P EST LOW 20 MIN: CPT | Performed by: ORTHOPAEDIC SURGERY

## 2023-08-21 NOTE — PROGRESS NOTES
Clinic Note [facility]     Assessment/Plan:  77year old female    Right wrist nondisplaced distal radius fracture-DOI 3/17/2023-continue home exercise program.  Expect improvements in functional outcome until 1 year  Subluxation of the ECU tendon-likely the etiology of her wrist clicking  Wrist tendinitis-NSAIDs as needed. Will likely improve with time. Can consider corticosteroid injection  Follow Up: as needed  Diagnostic Studies:  Right wrist 3 views: These revealed evidence of a distal radius fracture best viewed on the lateral images. No significant displacement. MRI of the wrist revealed the previous distal radius fracture with a carpal boss but no obvious masses or other significant abnormalities. Physical Exam:    There were no vitals taken for this visit. Constitutional: NAD. AOx3. Well-developed and Well-nourished. Psychiatric: Normal mood/ affect/ behavior. Judgment and thought content normal.     Right upper Extremity:   Inspection: Skin Intact. No skin lesions. No gross deformity. Presence of carpal bones   Palpation:  nontender to palpation over the right wrist at the distal radius at Eduardo's tubercle. Non tender to dorsal wrist and ulnar wrist  Full supination and pronation    Tender along the ulnar wrist as well as the dorsal wrist at the [facility] joint and ECRL ECRB insertion. Motion: Elbow: normal bilateral symmetric ext/flex  Wrist: Extension/flexion 50/40, pronation/supination 60/60  Finger: full composite fist, EPL intact  Wrist extension lacking 10 degees. Wrist flexion lacking 15 degrees        CC: No chief complaint on file. HPI: This 77year old female presents with complaints of pain to her right wrist.  She states on 3/17/2023 she fell while bowling. She had pain and swelling and went to the urgent care they told her to follow-up with orthopedics. She was put in a fiberglass splint.     Interval history: Patient reports some stiffness and reports of a rubbing sensation. Past Medical History:   Diagnosis Date    Change in hair 2012    thinning hair - ridges in nails    Decorative tattoo 2007    Diverticulosis     Dyspareunia     Easy bruising 2015    Eye disease dry eye disease    Family history of breast cancer     H/O mammogram 11/20/2013    benign    High blood pressure     High cholesterol 2016    taking enalapril and cholesterol is now at good range    Hypertension     OAB (overactive bladder)     MANSOOR (obstructive sleep apnea) 6/15/2021 PSG    AHI 5.2  REM AHI 35.2 Supine AHI 10.7 non-supine AHI 0 Sao2 Paulie     Pap smear for cervical cancer screening 05/18/2001; 01/31/2000    WNL    PONV (postoperative nausea and vomiting)     Sleep apnea     uses cpap    Unspecified essential hypertension     Urinary incontinence     Visual impairment     glasses    Wears glasses      Past Surgical History:   Procedure Laterality Date    ANTERIOR REPAIR  09/23/2021    COLONOSCOPY  2012    last one was done in 2012    EYE SURGERY      Crossed eyed surgery as young child    HAND/FINGER SURGERY UNLISTED  05/2012    middle right - no metal    HYSTERECTOMY  03/06/2000    ELMER    JOAO BIOPSY STEREO NODULE 1 SITE RIGHT (CPT=19081) Right 01/2020    benign. MIDURETHRAL SLING  09/23/2021    OOPHORECTOMY Left 11/30/2016    left salpingooopherectomy    POSTERIOR REPAIR  09/23/2021    TONSILLECTOMY      TOTAL ABDOM HYSTERECTOMY  2000    UTEROSACRAL LIGAMENT SUSPENSION  09/23/2021     Current Outpatient Medications   Medication Sig Dispense Refill    Alendronate-Cholecalciferol  MG-UNIT Oral Tab       ESTRADIOL 0.1 MG/GM Vaginal Cream APPLY 1/2 GRAM VAGINALLY 2 TIMES PER WEEK. 42.5 g 3    alendronate 70 MG Oral Tab Take 1 tablet (70 mg total) by mouth every 7 days. RESTASIS 0.05 % Ophthalmic Emulsion Place 1 drop into both eyes in the morning and 1 drop before bedtime. Glycerin PF (OASIS TEARS PF) 0.22 % Ophthalmic Solution Place 1 drop into both eyes every morning. pravastatin 40 MG Oral Tab Take 1 tablet (40 mg total) by mouth nightly. MAGNESIUM OR Take 1 tablet by mouth daily. CALCIUM CITRATE OR Take 500 mg by mouth daily. Coenzyme Q10 (COQ10) 200 MG Oral Cap Take 1 capsule by mouth daily. Multiple Vitamins-Minerals (PRESERVISION AREDS) Oral Tab Take 1 tablet by mouth daily. Collagen-Boron-Hyaluronic Acid (MOVE FREE ULTRA JOINT HEALTH) 40-5-3.3 MG Oral Tab Take 1 tablet by mouth daily. Vitamin D3 50 MCG (2000 UT) Oral Cap Take 1 capsule (2,000 Units total) by mouth daily. OMEGA-3 FATTY ACIDS OR Take 1,000 mg by mouth daily. Probiotic Product (PROBIOTIC OR) Take 1 capsule by mouth daily. TURMERIC OR Take by mouth daily. aspirin 81 MG Oral Tab Take 1 tablet (81 mg total) by mouth daily. 30 tablet 3    Enalapril Maleate (VASOTEC) 20 MG Oral Tab Take 1 tablet (20 mg total) by mouth daily. Morphine                NAUSEA AND VOMITING  Latex                   ITCHING  Family History   Problem Relation Age of Onset    Breast Cancer Mother 54        mid 52's    Hypertension Mother     Cancer Mother         lung    Cancer Father         Lukemia    Hypertension Brother     Heart Disorder Brother         Hardning of Arterys    Heart Disorder Maternal Grandfather         Hardning of the Artery    Stroke Paternal Grandfather     Cancer Paternal Aunt         colon    Colon Cancer Paternal Aunt      Social History    Occupational History      Not on file    Tobacco Use      Smoking status: Former        Packs/day: 1.00        Years: 20.00        Pack years: 20        Types: Cigarettes        Quit date: 1987        Years since quittin.4      Smokeless tobacco: Never    Vaping Use      Vaping Use: Never used    Substance and Sexual Activity      Alcohol use:  Yes        Alcohol/week: 6.0 standard drinks of alcohol        Types: 6 Glasses of wine per week      Drug use: No      Sexual activity: Yes        Partners: Male Review of Systems (negative unless bolded):  General: fevers, chills, fatigue  CV:  chest pain, palpitations, leg swelling  Msk: bodyaches, neck pain, neck stiffness  Skin: rashes, open wounds, nonhealing ulcers  Hem: bleeds easily, bruise easily, immunocompromised  Neuro: dizziness, light headedness, headaches  Psych: anxious, depressed, anger issues    I, Si Melissa, attest that this documentation has been prepared under the direction and in the presence of Dr. Cheri Kessler MD. **     Cheri Kessler MD  Hand, Wrist, & Elbow Surgery  [facility]  [facility] [address], [address], [place], [address] [facility]. [facility]  [email or web address]  t: [id number]  f: [phone]

## 2023-09-22 ENCOUNTER — TELEPHONE (OUTPATIENT)
Dept: UROLOGY | Facility: CLINIC | Age: 68
End: 2023-09-22

## 2023-09-26 ENCOUNTER — OFFICE VISIT (OUTPATIENT)
Dept: UROLOGY | Facility: CLINIC | Age: 68
End: 2023-09-26
Attending: OBSTETRICS & GYNECOLOGY
Payer: MEDICARE

## 2023-09-26 VITALS
WEIGHT: 137 LBS | DIASTOLIC BLOOD PRESSURE: 86 MMHG | HEIGHT: 62 IN | BODY MASS INDEX: 25.21 KG/M2 | SYSTOLIC BLOOD PRESSURE: 123 MMHG

## 2023-09-26 DIAGNOSIS — Z98.890 POST-OPERATIVE STATE: ICD-10-CM

## 2023-09-26 DIAGNOSIS — N95.2 POSTMENOPAUSAL ATROPHIC VAGINITIS: Primary | ICD-10-CM

## 2023-09-26 DIAGNOSIS — N94.10 DYSPAREUNIA, FEMALE: ICD-10-CM

## 2023-09-26 DIAGNOSIS — N81.84 PELVIC MUSCLE WASTING: ICD-10-CM

## 2023-09-26 DIAGNOSIS — N39.41 URGE INCONTINENCE: ICD-10-CM

## 2023-09-26 PROCEDURE — 99212 OFFICE O/P EST SF 10 MIN: CPT

## 2023-09-26 RX ORDER — ESTRADIOL 0.1 MG/G
CREAM VAGINAL
Qty: 42.5 G | Refills: 3 | Status: SHIPPED | OUTPATIENT
Start: 2023-09-26

## 2023-11-02 ENCOUNTER — HOSPITAL ENCOUNTER (OUTPATIENT)
Dept: BONE DENSITY | Age: 68
Discharge: HOME | End: 2023-11-02
Attending: INTERNAL MEDICINE
Payer: MEDICARE

## 2023-11-02 DIAGNOSIS — M81.0 OSTEOPOROSIS, POSTMENOPAUSAL: ICD-10-CM

## 2023-11-02 PROCEDURE — 77080 DXA BONE DENSITY AXIAL: CPT | Performed by: INTERNAL MEDICINE

## 2023-11-03 ENCOUNTER — LAB ENCOUNTER (OUTPATIENT)
Dept: LAB | Age: 68
End: 2023-11-03
Attending: INTERNAL MEDICINE
Payer: MEDICARE

## 2023-11-03 DIAGNOSIS — E78.00 ELEVATED CHOLESTEROL: ICD-10-CM

## 2023-11-03 DIAGNOSIS — I10 ESSENTIAL HYPERTENSION, BENIGN: ICD-10-CM

## 2023-11-03 LAB
ALBUMIN SERPL-MCNC: 3.6 G/DL (ref 3.4–5)
ALBUMIN/GLOB SERPL: 1.1 {RATIO} (ref 1–2)
ALP LIVER SERPL-CCNC: 31 U/L
ALT SERPL-CCNC: 27 U/L
ANION GAP SERPL CALC-SCNC: 3 MMOL/L (ref 0–18)
AST SERPL-CCNC: 19 U/L (ref 15–37)
BILIRUB SERPL-MCNC: 1 MG/DL (ref 0.1–2)
BUN BLD-MCNC: 13 MG/DL (ref 9–23)
CALCIUM BLD-MCNC: 9 MG/DL (ref 8.5–10.1)
CHLORIDE SERPL-SCNC: 107 MMOL/L (ref 98–112)
CHOLEST SERPL-MCNC: 182 MG/DL (ref ?–200)
CO2 SERPL-SCNC: 28 MMOL/L (ref 21–32)
CREAT BLD-MCNC: 0.87 MG/DL
EGFRCR SERPLBLD CKD-EPI 2021: 72 ML/MIN/1.73M2 (ref 60–?)
FASTING PATIENT LIPID ANSWER: YES
FASTING STATUS PATIENT QL REPORTED: YES
GLOBULIN PLAS-MCNC: 3.4 G/DL (ref 2.8–4.4)
GLUCOSE BLD-MCNC: 90 MG/DL (ref 70–99)
HDLC SERPL-MCNC: 79 MG/DL (ref 40–59)
LDLC SERPL CALC-MCNC: 92 MG/DL (ref ?–100)
NONHDLC SERPL-MCNC: 103 MG/DL (ref ?–130)
OSMOLALITY SERPL CALC.SUM OF ELEC: 286 MOSM/KG (ref 275–295)
POTASSIUM SERPL-SCNC: 4.5 MMOL/L (ref 3.5–5.1)
PROT SERPL-MCNC: 7 G/DL (ref 6.4–8.2)
SODIUM SERPL-SCNC: 138 MMOL/L (ref 136–145)
TRIGL SERPL-MCNC: 57 MG/DL (ref 30–149)
VLDLC SERPL CALC-MCNC: 9 MG/DL (ref 0–30)

## 2023-11-03 PROCEDURE — 80061 LIPID PANEL: CPT

## 2023-11-03 PROCEDURE — 36415 COLL VENOUS BLD VENIPUNCTURE: CPT

## 2023-11-03 PROCEDURE — 80053 COMPREHEN METABOLIC PANEL: CPT

## 2023-12-24 ENCOUNTER — HOSPITAL ENCOUNTER (OUTPATIENT)
Dept: MRI IMAGING | Age: 68
End: 2023-12-24
Attending: INTERNAL MEDICINE
Payer: MEDICARE

## 2023-12-24 ENCOUNTER — HOSPITAL ENCOUNTER (OUTPATIENT)
Dept: MRI IMAGING | Age: 68
Discharge: HOME | End: 2023-12-24
Attending: INTERNAL MEDICINE
Payer: MEDICARE

## 2023-12-24 DIAGNOSIS — M54.32 LEFT SIDED SCIATICA: ICD-10-CM

## 2023-12-24 PROCEDURE — 72148 MRI LUMBAR SPINE W/O DYE: CPT | Performed by: INTERNAL MEDICINE

## 2024-01-05 ENCOUNTER — ORDER TRANSCRIPTION (OUTPATIENT)
Dept: PHYSICAL THERAPY | Facility: HOSPITAL | Age: 69
End: 2024-01-05

## 2024-01-05 DIAGNOSIS — M79.18 PIRIFORMIS MUSCLE PAIN: ICD-10-CM

## 2024-01-05 DIAGNOSIS — M54.50 LUMBAR PAIN: Primary | ICD-10-CM

## 2024-02-02 ENCOUNTER — OFFICE VISIT (OUTPATIENT)
Dept: PHYSICAL THERAPY | Facility: HOSPITAL | Age: 69
End: 2024-02-02
Attending: INTERNAL MEDICINE
Payer: MEDICARE

## 2024-02-02 DIAGNOSIS — M54.50 LUMBAR PAIN: Primary | ICD-10-CM

## 2024-02-02 DIAGNOSIS — M79.18 PIRIFORMIS MUSCLE PAIN: ICD-10-CM

## 2024-02-02 PROCEDURE — 97110 THERAPEUTIC EXERCISES: CPT

## 2024-02-02 PROCEDURE — 97161 PT EVAL LOW COMPLEX 20 MIN: CPT

## 2024-02-02 NOTE — PROGRESS NOTES
SPINE EVALUATION:     Diagnosis:   Lumbar pain (M54.50)  Piriformis muscle pain (M79.18)   Referring Provider: Rebecca  Date of Evaluation:    2/2/2024    Precautions:  None Next MD visit:   none scheduled  Date of Surgery: n/a     PATIENT SUMMARY   Yamile Marquez is a 68 year old female who presents to therapy today with complaints of low back and sacral pain. Pt was assisting  with replacing deck boards in October. The next week she started having issues. Shooting pain down into buttocks and back of leg (not past knee) and not painful joint crepitus. Pt follow up with PCP and received an MRI. Pt was able to see a chiropractor 2x per week and now every other week since November. Chiropractor was doing mobilizations, ultrasound and e stim, also gave her some stretches. Overall, pt's symptoms have improved greatly, but still has some pain in the left low back upon waking each day. Pt attends weekly yoga, aquatics, pilates and strengthening classes.     Pt describes pain level current 0-1/10, at best 0/10, at worst 1-2/10.   Aggravating: laying flat on back, bending over to lift heavy items  Relieving: ibuprofen, massage  Nature: dull ache  24-Hour Pattern: N/A  How long do symptoms last? Dull ache throughout the day    Current functional limitations include pain upon waking in the morning.   Yamile describes prior level of function had back pain in the past, but never this severe. Pt goals include get rid of rest of pain.  Past medical history was reviewed with Yamile. Significant findings include  has a past medical history of Change in hair (2012), Decorative tattoo (2007), Diverticulosis, Dyspareunia, Easy bruising (2015), Eye disease (dry eye disease), Family history of breast cancer, H/O mammogram (11/20/2013), High blood pressure, High cholesterol (2016), Hypertension, OAB (overactive bladder), MANSOOR (obstructive sleep apnea) (6/15/2021 PSG), Pap smear for cervical cancer screening (05/18/2001;  01/31/2000), PONV (postoperative nausea and vomiting), Sleep apnea, Unspecified essential hypertension, Urinary incontinence, Visual impairment, and Wears glasses.    She has no past medical history of Difficult intubation, Malignant hyperthermia, or Pseudocholinesterase deficiency.   Pt denies diplopia, dysarthria, dysphasia, dizziness, drop attacks, bowel/bladder changes, saddle anesthesia, and NOE LE N/T.    ASSESSMENT  Yamile presents to physical therapy evaluation with primary c/o left low back pain. The results of the objective tests and measures show WNL hip and lumbar ROM, pain with unilateral PA to L5/S1, positive TUNDE on L, negative SI tests.  Functional deficits include but are not limited to pain upon waking and rising from bed.  Signs and symptoms are consistent with diagnosis of lumbar pain at L5/S1. Pt and PT discussed evaluation findings, pathology, POC and HEP.  Pt voiced understanding and performs HEP correctly without reported pain. Skilled Physical Therapy is medically necessary to address the above impairments and reach functional goals.     OBJECTIVE:   Observation/Posture: Unremarkable  Neuro Screen: SILT, patellar reflexes 2+ bilaterally, achilles 1+ bilaterally     Lumbar AROM: (* denotes performed with pain)  Flexion: 70 deg  Extension: 10 deg  Sidebending: R 46cm ; L 44cm   Rotation: R WNL; L WNL    Hip AROM/PROM: WNL BLE    Accessory motion:   Lumbar PA: WNL  Lumbar transverse PA: WNL   Lumbar Unilateral PA: pain at L5/S1 segment     Palpation: WNL muscle tension of piriformis bilaterally    Strength: (* denotes performed with pain)  LE   Hip flexion (L2): R 5/5; L 5/5  Hip abduction: R 5/5; L 5/5  Hip adduction: R 5/5; L 5/5  Knee Flexion: R 5/5; L 5/5   Knee extension (L3): R 5/5; L 5/5   DF (L4): R 5/5; L 5/5  PF (S1): R 5/5; L 5/5     Special tests:   SLR: 80 deg R/L   DEANDRA: Negative L/R  TUNDE: R 25 deg, L* 26 cm   Gaenslen's: negative  Sacral compression: negative  Sacral  distraction: negative     Today’s Treatment and Response:   Pt education was provided on exam findings, treatment diagnosis, treatment plan, expectations, and prognosis. Pt was also provided recommendations for possible soreness after evaluation and importance of remaining active  Patient was instructed in and issued a HEP for:   Access Code: [email or web address]  URL: [email or web address]  Date: 02/02/2024  Prepared by: Charlotte Love    Exercises  - Supine Figure 4 Piriformis Stretch  - 1 x daily - 7 x weekly - 2-3 sets - 3 reps - 20 sec hold  - Supine Lower Trunk Rotation  - 1 x daily - 7 x weekly - 2 sets - 10 reps  - Clamshell with Resistance  - 1 x daily - 7 x weekly - 2 sets - 10 reps  - Bird Dog  - 1 x daily - 7 x weekly - 2 sets - 10 reps  - Figure 4 Bridge  - 1 x daily - 7 x weekly - 2 sets - 10 reps    Charges: PT Eval Low Complexity, Therex 1      Total Timed Treatment: 15 min     Total Treatment Time: 40 min     Based on clinical rationale and outcome measures, this evaluation involved Low Complexity decision making due to no personal factors/comorbidities, 1-2 body structures involved/activity limitations, and stable symptoms.  PLAN OF CARE:    Goals: (to be met in 6 visits)   - Pt will have proper transverse abdominal muscle recruitment without cues from therapist to improve postural stability.  - Pt will report symptoms upon waking 3 days or less during the week to improve transfers to/from bed.  - Pt will have WNL lumbar spine and hip muscle tension as evidenced by negative TUNDE test to improve functional mobility  - Pt will be independent with comprehensive HEP to continue centralization of symptoms and maintain gains in therapy.     Frequency / Duration: Patient will be seen for 1 x/week or a total of 6 visits over a 90 day period. Treatment will include: Gait training, Manual Therapy, Mechanical Traction, Neuromuscular Re-education, Therapeutic Activities, Therapeutic Exercise, and Home Exercise  Program instruction    Education or treatment limitation: None  Rehab Potential:excellent    Patient/Family/Caregiver was advised of these findings, precautions, and treatment options and has agreed to actively participate in planning and for this course of care.    Thank you for your referral. Please co-sign or sign and return this letter via fax as soon as possible to [phone]. If you have any questions, please contact me at Dept: [phone]    Sincerely,  Electronically signed by therapist: Charlotte Love    Physician's certification required: Yes  I certify the need for these services furnished under this plan of treatment and while under my care.    X___________________________________________________ Date____________________    Certification From: 2/2/2024  To:5/2/2024

## 2024-02-07 ENCOUNTER — OFFICE VISIT (OUTPATIENT)
Dept: PHYSICAL THERAPY | Facility: HOSPITAL | Age: 69
End: 2024-02-07
Attending: INTERNAL MEDICINE
Payer: MEDICARE

## 2024-02-07 PROCEDURE — 97110 THERAPEUTIC EXERCISES: CPT

## 2024-02-07 PROCEDURE — 97140 MANUAL THERAPY 1/> REGIONS: CPT

## 2024-02-07 NOTE — PROGRESS NOTES
Diagnosis:   Lumbar pain (M54.50)  Piriformis muscle pain (M79.18      Referring Provider: Rebecca  Date of Evaluation:    2/2/2024    Precautions:  None Next MD visit:   none scheduled  Date of Surgery: n/a   Insurance Primary/Secondary: MEDICARE / [employer] [place] INDEMNITY     # Auth Visits: 6            Subjective: Pt reports no issues with HEP at home. She went to a strength training class today where they did both upper and lower body exercises. Still seeing chiropractor every other week.   Pain: 0/10    Objective:     TUNDE: R 25.5 deg, L* 24 cm   PA assessment at lumbar spine: slightly stiff with unilateral PA to L5/S1 on the L side  Squats: pt's knees tend to fall in slightly with bodyweight squat, added band around knees for external cue and form improves    Assessment: Pt being seen for first follow up after initial evaluation. Pt reports no pain since last session, just some stiffness upon waking. Pt performs HEP as prescribed without issue. Pt tolerates abdominal and LE strengthening at today's session without adverse effect. During free squat, pt noted to have both knees fall in slightly. Band added for external cue at knees, this improves squat form and pt reports increased stability. Continue per plan of care to strengthen core and BLE to achieve functional goals.     Goals: (to be met in 6 visits)   - Pt will have proper transverse abdominal muscle recruitment without cues from therapist to improve postural stability.  - Pt will report symptoms upon waking 3 days or less during the week to improve transfers to/from bed.  - Pt will have WNL lumbar spine and hip muscle tension as evidenced by negative TUNDE test to improve functional mobility  - Pt will be independent with comprehensive HEP to continue centralization of symptoms and maintain gains in therapy.     Plan: Continue per plan of care. Next session: bridge with swiss ball and hip/knee flexion  Date: 2/7/2024  TX#: 2/6 Date:                 TX#: 3/  Date:                 TX#: 4/ Date:                 TX#: 5/ Date:   Tx#: 6/   Manual: 10 min  Lateral hip glide, gr IV, 2x10 R/L  Unilateral PA's to left L5/S1, gr III, 2x10       Therex 30 min  V up crunch with ball pass, 2x10   Lateral/backwards sliders with TRX bands, x10 R/L   Forward/lateral/backwards sliders with TRX bands, x10 R/L   Paloff press with #20 lbs, 2x10 facing each way  DL Shuttle press, 2x10, #75   Hip extension, quadriped on shuttle press, x10 R/L   Monster walks forward/backwards, green tb, 3 laps x 15 ft  Squats with dowel cresencio and band around knee, 3x10                     HEP: Access Code: [email or web address]  URL: [email or web address]  Date: 02/02/2024  Prepared by: Charlotte Love     Exercises  - Supine Figure 4 Piriformis Stretch  - 1 x daily - 7 x weekly - 2-3 sets - 3 reps - 20 sec hold  - Supine Lower Trunk Rotation  - 1 x daily - 7 x weekly - 2 sets - 10 reps  - Clamshell with Resistance  - 1 x daily - 7 x weekly - 2 sets - 10 reps  - Bird Dog  - 1 x daily - 7 x weekly - 2 sets - 10 reps  - Figure 4 Bridge  - 1 x daily - 7 x weekly - 2 sets - 10 reps    Charges: TE 2 Manual 1       Total Timed Treatment: 40 min  Total Treatment Time: 40 min

## 2024-02-09 ENCOUNTER — APPOINTMENT (OUTPATIENT)
Dept: PHYSICAL THERAPY | Facility: HOSPITAL | Age: 69
End: 2024-02-09
Attending: INTERNAL MEDICINE
Payer: MEDICARE

## 2024-02-12 ENCOUNTER — OFFICE VISIT (OUTPATIENT)
Dept: PHYSICAL THERAPY | Facility: HOSPITAL | Age: 69
End: 2024-02-12
Attending: INTERNAL MEDICINE
Payer: MEDICARE

## 2024-02-12 PROCEDURE — 97110 THERAPEUTIC EXERCISES: CPT

## 2024-02-12 PROCEDURE — 97140 MANUAL THERAPY 1/> REGIONS: CPT

## 2024-02-12 NOTE — PROGRESS NOTES
Diagnosis:   Lumbar pain (M54.50)  Piriformis muscle pain (M79.18      Referring Provider: Rebecca  Date of Evaluation:    2/2/2024    Precautions:  None Next MD visit:   none scheduled  Date of Surgery: n/a   Insurance Primary/Secondary: MEDICARE / [place] INDEMNITY     # Auth Visits: 6            Subjective: Pt reports that she felt good after last visit. Did a strength training class today and played a little bit of pickleball and no pain after these activities.   Pain: 0/10    Objective:     TUNDE: R 26 *deg, L 28 cm   PA assessment at lumbar spine: slightly stiff with unilateral PA to L5/S1 on the L side  BOSU squats: increase M/L sway at bottom of squat, dowel cresencio used for support.       Assessment: Pt is improving in TUNDE bilaterally with HEP and intervention. R TUNDE produces some left sided low back pain at today's session. Pt tolerates lower extremity and abdominal strengthening without adverse reaction during therapy sessions. Pt able to progress bridging exercise with swiss ball and hamstring curl to increase challenge. Continue per plan of care to achieve functional goals.     Goals: (to be met in 6 visits)   - Pt will have proper transverse abdominal muscle recruitment without cues from therapist to improve postural stability.  - Pt will report symptoms upon waking 3 days or less during the week to improve transfers to/from bed.  - Pt will have WNL lumbar spine and hip muscle tension as evidenced by negative TUNDE test to improve functional mobility  - Pt will be independent with comprehensive HEP to continue centralization of symptoms and maintain gains in therapy.     Plan: Continue per plan of care. Next session: ab twist with weight, curtsy lunge with slider  Date: 2/7/2024  TX#: 2/6 Date:2/12/2024                TX#: 3/6 Date:                 TX#: 4/ Date:                 TX#: 5/ Date:   Tx#: 6/   Manual: 10 min  Lateral hip glide, gr IV, 2x10 R/L  Unilateral PA's to left L5/S1, gr III, 2x10  Manual: 15 min  Lateral hip glide, gr IV, 2x10 R/L  Unilateral PA's to left L5/S1, gr IV, 2x10      Therex 30 min  V up crunch with ball pass, 2x10   Lateral/backwards sliders with TRX bands, x10 R/L   Forward/lateral/backwards sliders with TRX bands, x10 R/L   Paloff press with #20 lbs, 2x10 facing each way  DL Shuttle press, 2x10, #75   Hip extension, quadriped on shuttle press, x10 R/L   Monster walks forward/backwards, green tb, 3 laps x 15 ft  Squats with dowel cresencio and band around knee, 3x10 Therex: 25 min  Swiss ball bridge, x 10  Swiss ball bridge with hamstring curl, 2x10   V up crunch with ball pass, 2x10   DL shuttle press, x20, #75  Hip extension quadriped on shuttle press, #31, 2x10 R/L   Paloff press with #20 lbs, x10 facing each way  Paloff press with rotation #20, x10 L/R  Monster walks forward/backwards, green tb, 3 laps x 15 ft   Squats on BOSU, blue side down, dowel cresencio for balance  Forward/lateral/backwards sliders with TRX bands, x10 R/L                       HEP: Access Code: [email or web address]  URL: [email or web address]  Date: 02/02/2024  Prepared by: Charlotte Love     Exercises  - Supine Figure 4 Piriformis Stretch  - 1 x daily - 7 x weekly - 2-3 sets - 3 reps - 20 sec hold  - Supine Lower Trunk Rotation  - 1 x daily - 7 x weekly - 2 sets - 10 reps  - Clamshell with Resistance  - 1 x daily - 7 x weekly - 2 sets - 10 reps  - Bird Dog  - 1 x daily - 7 x weekly - 2 sets - 10 reps  - Figure 4 Bridge  - 1 x daily - 7 x weekly - 2 sets - 10 reps    Charges: TE 2 Manual 1       Total Timed Treatment: 40 min  Total Treatment Time: 40 min

## 2024-02-14 ENCOUNTER — APPOINTMENT (OUTPATIENT)
Dept: PHYSICAL THERAPY | Facility: HOSPITAL | Age: 69
End: 2024-02-14
Attending: INTERNAL MEDICINE
Payer: MEDICARE

## 2024-02-19 ENCOUNTER — OFFICE VISIT (OUTPATIENT)
Dept: PHYSICAL THERAPY | Facility: HOSPITAL | Age: 69
End: 2024-02-19
Attending: INTERNAL MEDICINE
Payer: MEDICARE

## 2024-02-19 PROCEDURE — 97110 THERAPEUTIC EXERCISES: CPT

## 2024-02-19 NOTE — PROGRESS NOTES
Diagnosis:   Lumbar pain (M54.50)  Piriformis muscle pain (M79.18      Referring Provider: Rebecca  Date of Evaluation:    2/2/2024    Precautions:  None Next MD visit:   none scheduled  Date of Surgery: n/a   Insurance Primary/Secondary: MEDICARE / [place]DEMN[place]     # Auth Visits: 6            Subjective: Pt reports she is feeling pretty good. No issues over past week. Still a slight amount of back pain upon waking but resolves quickly.   Pain: 0/10    Objective:     TUNDE: R 26 deg, L 26 cm     Assessment: Pt has attended 4 sessions in PT. Pt states she feels like she is doing well and would like to wrap up with physical therapy. Pt is independent with HEP and has continued with exercise classes without pain. Pt has continued centralization of symptoms and improved functional mobility. Pt's chart to be left open for 1 month if pt needs any further treatment.     Goals: (to be met in 6 visits)   - Pt will have proper transverse abdominal muscle recruitment without cues from therapist to improve postural stability.Goal met, 2/19/2024  - Pt will report symptoms upon waking 3 days or less during the week to improve transfers to/from bed. Goal met, 2/19/2024  - Pt will have WNL lumbar spine and hip muscle tension as evidenced by negative TUNDE test to improve functional mobilityGoal met, 2/19/2024  - Pt will be independent with comprehensive HEP to continue centralization of symptoms and maintain gains in therapy. Goal met, 2/19/2024    Plan: Pt requests this to be final visit. Pt's chart to be kept open for 1 month and pt to call back if any further care needed. Chart to be closed after this time if nothing else needed.   Date: 2/7/2024  TX#: 2/6 Date:2/12/2024                TX#: 3/6 Date:2/19/2024                TX#: 4/6 Date:                 TX#: 5/ Date:   Tx#: 6/   Manual: 10 min  Lateral hip glide, gr IV, 2x10 R/L  Unilateral PA's to left L5/S1, gr III, 2x10 Manual: 15 min  Lateral hip glide, gr IV, 2x10  R/L  Unilateral PA's to left L5/S1, gr IV, 2x10      Therex 30 min  V up crunch with ball pass, 2x10   Lateral/backwards sliders with TRX bands, x10 R/L   Forward/lateral/backwards sliders with TRX bands, x10 R/L   Paloff press with #20 lbs, 2x10 facing each way  DL Shuttle press, 2x10, #75   Hip extension, quadriped on shuttle press, x10 R/L   Monster walks forward/backwards, green tb, 3 laps x 15 ft  Squats with dowel cresencio and band around knee, 3x10 Therex: 25 min  Swiss ball bridge, x 10  Swiss ball bridge with hamstring curl, 2x10   V up crunch with ball pass, 2x10   DL shuttle press, x20, #75  Hip extension quadriped on shuttle press, #31, 2x10 R/L   Paloff press with #20 lbs, x10 facing each way  Paloff press with rotation #20, x10 L/R  Monster walks forward/backwards, green tb, 3 laps x 15 ft   Squats on BOSU, blue side down, dowel cresencio for balance  Forward/lateral/backwards sliders with TRX bands, x10 R/L    Therex: 35 min  Swiss ball bridge with hamstring curl, 2x10   V up crunch with ball pass, 2x10   Birddog with blue band, x10 R/L   Curtsey lunge with TRX bands and slider, x20 R/L   DL shuttle press, x20, #75  SL shuttle press, x20 R/L, #50   Paloff press with rotation #20, 2x10 L/R  Monster walks forward/backwards, green tb, 2 laps x 15 ft  Lateral walks with green tb, 2 laps x 15 ft  Pt education: call back if any other needs, continue with exercise classes and HEP as tolerated                   HEP: Access Code: [email or web address]  URL: [email or web address]  Date: 02/02/2024  Prepared by: Charlotte Love     Exercises  - Supine Figure 4 Piriformis Stretch  - 1 x daily - 7 x weekly - 2-3 sets - 3 reps - 20 sec hold  - Supine Lower Trunk Rotation  - 1 x daily - 7 x weekly - 2 sets - 10 reps  - Clamshell with Resistance  - 1 x daily - 7 x weekly - 2 sets - 10 reps  - Bird Dog  - 1 x daily - 7 x weekly - 2 sets - 10 reps  - Figure 4 Bridge  - 1 x daily - 7 x weekly - 2 sets - 10 reps    Charges: TE 2      Total  Timed Treatment: 35 min  Total Treatment Time: 35 min

## 2024-02-21 ENCOUNTER — APPOINTMENT (OUTPATIENT)
Dept: PHYSICAL THERAPY | Facility: HOSPITAL | Age: 69
End: 2024-02-21
Attending: INTERNAL MEDICINE
Payer: MEDICARE

## 2024-02-27 ENCOUNTER — APPOINTMENT (OUTPATIENT)
Dept: PHYSICAL THERAPY | Facility: HOSPITAL | Age: 69
End: 2024-02-27
Attending: INTERNAL MEDICINE
Payer: MEDICARE

## 2024-03-13 ENCOUNTER — APPOINTMENT (OUTPATIENT)
Dept: PHYSICAL THERAPY | Facility: HOSPITAL | Age: 69
End: 2024-03-13
Attending: INTERNAL MEDICINE
Payer: MEDICARE

## 2024-09-04 ENCOUNTER — OFFICE VISIT (OUTPATIENT)
Dept: OBGYN CLINIC | Facility: CLINIC | Age: 69
End: 2024-09-04
Payer: MEDICARE

## 2024-09-04 VITALS — BODY MASS INDEX: 25 KG/M2 | DIASTOLIC BLOOD PRESSURE: 72 MMHG | SYSTOLIC BLOOD PRESSURE: 110 MMHG | WEIGHT: 139 LBS

## 2024-09-04 DIAGNOSIS — N89.8 VAGINAL PRURITUS: ICD-10-CM

## 2024-09-04 DIAGNOSIS — Z01.419 WELL WOMAN EXAM WITH ROUTINE GYNECOLOGICAL EXAM: Primary | ICD-10-CM

## 2024-09-04 DIAGNOSIS — N76.0 ACUTE VAGINITIS: ICD-10-CM

## 2024-09-04 DIAGNOSIS — N89.8 VAGINAL IRRITATION: ICD-10-CM

## 2024-09-04 LAB
BILIRUB UR QL STRIP.AUTO: NEGATIVE
CLARITY UR REFRACT.AUTO: CLEAR
GLUCOSE UR STRIP.AUTO-MCNC: NORMAL MG/DL
KETONES UR STRIP.AUTO-MCNC: NEGATIVE MG/DL
LEUKOCYTE ESTERASE UR QL STRIP.AUTO: NEGATIVE
NITRITE UR QL STRIP.AUTO: NEGATIVE
PH UR STRIP.AUTO: 7 [PH] (ref 5–8)
PROT UR STRIP.AUTO-MCNC: NEGATIVE MG/DL
RBC UR QL AUTO: NEGATIVE
SP GR UR STRIP.AUTO: 1.01 (ref 1–1.03)
UROBILINOGEN UR STRIP.AUTO-MCNC: NORMAL MG/DL

## 2024-09-04 PROCEDURE — 87086 URINE CULTURE/COLONY COUNT: CPT | Performed by: OBSTETRICS & GYNECOLOGY

## 2024-09-04 PROCEDURE — 87491 CHLMYD TRACH DNA AMP PROBE: CPT | Performed by: OBSTETRICS & GYNECOLOGY

## 2024-09-04 PROCEDURE — 81514 NFCT DS BV&VAGINITIS DNA ALG: CPT | Performed by: OBSTETRICS & GYNECOLOGY

## 2024-09-04 PROCEDURE — G0101 CA SCREEN;PELVIC/BREAST EXAM: HCPCS | Performed by: OBSTETRICS & GYNECOLOGY

## 2024-09-04 PROCEDURE — 87591 N.GONORRHOEAE DNA AMP PROB: CPT | Performed by: OBSTETRICS & GYNECOLOGY

## 2024-09-04 PROCEDURE — 81003 URINALYSIS AUTO W/O SCOPE: CPT | Performed by: OBSTETRICS & GYNECOLOGY

## 2024-09-04 RX ORDER — TOBRAMYCIN AND DEXAMETHASONE 3; 1 MG/ML; MG/ML
1 SUSPENSION/ DROPS OPHTHALMIC 2 TIMES DAILY
COMMUNITY
Start: 2024-02-01

## 2024-09-04 RX ORDER — CLOBETASOL PROPIONATE 0.5 MG/G
CREAM TOPICAL
COMMUNITY

## 2024-09-04 RX ORDER — ROSUVASTATIN CALCIUM 40 MG/1
1 TABLET, COATED ORAL NIGHTLY
COMMUNITY
Start: 2023-12-01

## 2024-09-04 NOTE — PATIENT INSTRUCTIONS
Luvena or coconut oil - vaginal moisturizer, apply daily or 2 times per week     Revaree suppository or Hyalo Gyn Gel - twice a week vaginal moisturizer (hyaluronic acid), insert capsule or gel 2 times per week     Uberlube or coconut oil  - personal lubricant as needed

## 2024-09-04 NOTE — PROGRESS NOTES
[facility]  Obstetrics and Gynecology  History & Physical    CC: Patient presents for a well woman exam     Subjective:     HPI: Yamile Marquez is a 69 year old  female here for a well women exam. Patient reports doing relatively well.  However, she recently had intercourse with her partner 2 weeks ago and noted vaginal irritation and provide is following intercourse.  Patient concern for possible bacterial vaginosis.  Reports vaginal discharge.  Denies vaginal bleeding.  Denies dysuria or hematuria.  Patient reports vaginal estrogen cream twice per week.  Patient reports vaginal lubricant including a gel based lubricant. Hx of hysterectomy in . History of left oophorectomy 2016. Right ovary remains in situ. Hx of 2021 repair of enterocele, uterosacral ligament suspension, AP repair, sling, cystoscopy.     OB History:  OB History    Para Term  AB Living   1 1 1 0 0 1   SAB IAB Ectopic Multiple Live Births   0 0 0 0 1      # Outcome Date GA Lbr Otto/2nd Weight Sex Type Anes PTL Lv   1 Term    7 lb (3.175 kg) M Vag-Spont   PATIENCE       Gyne History:  Hx Prior Abnormal Pap: No  Pap Date: 05/15/01  No LMP recorded. Patient has had a hysterectomy.    Denies history of STDs (non-HPV).    Sexual history: Active? Yes  Denies history of abnormal Pap smear.  Reports pathology benign following her hysterectomy.  Hx of hysterectomy in . History of left oophorectomy 2016. Right ovary remains in situ. Hx of 2021 repair of enterocele, uterosacral ligament suspension, AP repair, sling, cystoscopy.    Meds:  Current Outpatient Medications on File Prior to Visit   Medication Sig Dispense Refill    tobramycin-dexamethasone 0.3-0.1 % Ophthalmic Suspension Place 1 drop into the right eye 2 (two) times daily.      rosuvastatin 40 MG Oral Tab Take 1 tablet (40 mg total) by mouth nightly.      clobetasol 0.05 % External Cream APPLY TO AFFECTED AREA OF RIGHT ABDOMEN TWICE DAILY FOR 2-3  WEEKS, THEN AS NEEDED FOR FLARES.      estradiol 0.1 MG/GM Vaginal Cream Apply 1/2 gram vaginally 2 times per week. 42.5 g 3    alendronate 70 MG Oral Tab Take 1 tablet (70 mg total) by mouth every 7 days.      RESTASIS 0.05 % Ophthalmic Emulsion Place 1 drop into both eyes in the morning and 1 drop before bedtime.      Glycerin PF (OASIS TEARS PF) 0.22 % Ophthalmic Solution Place 1 drop into both eyes every morning.      CALCIUM CITRATE OR Take 500 mg by mouth daily.      Coenzyme Q10 (COQ10) 200 MG Oral Cap Take 1 capsule by mouth daily.      Multiple Vitamins-Minerals (PRESERVISION AREDS) Oral Tab Take 1 tablet by mouth daily.      Collagen-Boron-Hyaluronic Acid (MOVE FREE ULTRA JOINT HEALTH) 40-5-3.3 MG Oral Tab Take 1 tablet by mouth daily.      OMEGA-3 FATTY ACIDS OR Take 1,000 mg by mouth daily.      Probiotic Product (PROBIOTIC OR) Take 1 capsule by mouth daily.      TURMERIC OR Take by mouth daily.      aspirin 81 MG Oral Tab Take 1 tablet (81 mg total) by mouth daily. 30 tablet 3    Enalapril Maleate (VASOTEC) 20 MG Oral Tab Take 1 tablet (20 mg total) by mouth daily.       No current facility-administered medications on file prior to visit.       All:  Allergies   Allergen Reactions    Morphine NAUSEA AND VOMITING    Latex ITCHING       PMH:  Past Medical History:    Change in hair    thinning hair - ridges in nails    Decorative tattoo    Diverticulosis    Dyspareunia    Easy bruising    Eye disease    Family history of breast cancer    H/O mammogram    benign    High blood pressure    High cholesterol    taking enalapril and cholesterol is now at good range    Hypertension    OAB (overactive bladder)    MANSOOR (obstructive sleep apnea)    AHI 5.2  REM AHI 35.2 Supine AHI 10.7 non-supine AHI 0 Sao2 Paulie     Pap smear for cervical cancer screening    WNL    PONV (postoperative nausea and vomiting)    Sleep apnea    uses cpap    Unspecified essential hypertension    Urinary incontinence    Visual impairment     glasses    Wears glasses       Immunization History:   Immunization History   Administered Date(s) Administered    Covid-19 Vaccine Pfizer 30 mcg/0.3 ml 2021, 2021, 10/05/2021    Covid-19 Vaccine Pfizer Bivalent 30mcg/0.3mL 2022    Covid-19 Vaccine Pfizer Carlos-Sucrose 30 mcg/0.3 ml 2022    FLU VAC High Dose 65 YRS & Older PRSV Free (51848) 2020    Fluzone Vaccine Medicare () 10/07/2019    Influenza 2016, 2017, 10/18/2018, 10/07/2019, 2020    Pneumococcal (Prevnar 13) 2019    Pneumovax 23 2020       PSH:  Past Surgical History:   Procedure Laterality Date    Anterior repair  2021    Colonoscopy  2012    last one was done in     Eye surgery      Crossed eyed surgery as young child    Hand/finger surgery unlisted  2012    middle right - no metal    Hysterectomy  2000    [facility]    Edson biopsy stereo nodule 1 site right (cpt=19081) Right 2020    benign.    Midurethral sling  2021    Oophorectomy Left 2016    left salpingooopherectomy    Posterior repair  2021    Tonsillectomy      Total abdom hysterectomy      Uterosacral ligament suspension  2021       Social History:  Social History     Socioeconomic History    Marital status:      Spouse name: Not on file    Number of children: Not on file    Years of education: Not on file    Highest education level: Not on file   Occupational History    Not on file   Tobacco Use    Smoking status: Former     Current packs/day: 0.00     Average packs/day: 1 pack/day for 20.0 years (20.0 ttl pk-yrs)     Types: Cigarettes     Start date: 1967     Quit date: 1987     Years since quittin.4    Smokeless tobacco: Never   Vaping Use    Vaping status: Never Used   Substance and Sexual Activity    Alcohol use: Yes     Alcohol/week: 6.0 standard drinks of alcohol     Types: 6 Glasses of wine per week    Drug use: No    Sexual activity: Yes     Partners: Male    Other Topics Concern     Service Not Asked    Blood Transfusions Not Asked    Caffeine Concern Yes     Comment: coffee     Occupational Exposure Not Asked    Hobby Hazards Not Asked    Sleep Concern Not Asked    Stress Concern Not Asked    Weight Concern Not Asked    Special Diet Not Asked    Back Care Not Asked    Exercise Yes     Comment: trying everyday     Bike Helmet Not Asked    Seat Belt Yes    Self-Exams Not Asked   Social History Narrative    Not on file     Social Determinants of Health     Financial Resource Strain: Not on file   Food Insecurity: Not on file   Transportation Needs: Not on file   Physical Activity: Not on file   Stress: Not on file   Social Connections: Not on file   Housing Stability: Not on file         Patient feels unsafe or threatened?: denies    Abuse: denies physical, sexual or mental.     Family History:  Family History   Problem Relation Age of Onset    Breast Cancer Mother 55        mid 50's    Hypertension Mother     Cancer Mother         lung    Cancer Father         Lukemia    Hypertension Brother     Heart Disorder Brother         Hardning of Arterys    Heart Disorder Maternal Grandfather         Hardning of the Artery    Stroke Paternal Grandfather     Cancer Paternal Aunt         colon    Colon Cancer Paternal Aunt        Health maintenance:  Mammogram (age 38 and q1-2yr): 08/2023  Impression   CONCLUSION:    No suspicious change from prior mammography.  No mammographic evidence of malignancy.     BI-RADS CATEGORY:    DIAGNOSTIC CATEGORY 1--NEGATIVE ASSESSMENT.       RECOMMENDATIONS:    ROUTINE MAMMOGRAM AND CLINICAL EVALUATION IN 12 MONTHS.       Colonoscopy (age 43 and q10yr): 2023    Review of Systems:  General: no complaints per category. See HPI for additional information.   Breast: no complaints per category. See HPI for additional information.   Respiratory: no complaints per category. See HPI for additional information.   Cardiovascular: no complaints per  category. See HPI for additional information.   GI: no complaints per category. See HPI for additional information.   : no complaints per category. See HPI for additional information.   Heme: no complaints per category. See HPI for additional information.       Objective:     Vitals:    24 1438   BP: 110/72   Weight: 139 lb (63 kg)         Body mass index is 25.42 kg/m².    General: AAO.NAD.   CVS exam: normal peripheral perfusion  Chest: non-labored breathing, no tachypnea   Breast: symmetric, no dominant or suspicious mass, no skin or nipple changes and no axillary adenopathy  Abdominal exam: soft, nontender, nondistended  Pelvic exam:   VULVA: normal appearing vulva with no masses, tenderness or lesions  PERINEUM:  normal appearing, no lesions   URETHRAL MEATUS:  normal appearing, no lesions   VAGINA: atrophic without lesions or discharge. No bleeding. No masses. Non-tender.   CERVIX: surgically absent  UTERUS: surgically absent, vaginal cuff well healed  ADNEXA: normal adnexa in size, nontender and no masses  PERIRECTAL: normal appearing, no lesions   Ext: non-tender, no edema    Assessment:     Yamile Marquez is a 69 year old  female here for a well women exam.       Plan:       Problem List Items Addressed This Visit    None  Visit Diagnoses       Vaginal pruritus    -  Primary    Relevant Orders    Vaginitis Vaginosis PCR Panel    Chlamydia/Gc Amplification    Culture Urine    Urinalysis, Routine    Vaginal irritation        Relevant Orders    Culture Urine    Acute vaginitis        Relevant Orders    Culture Urine    Urinalysis, Routine            Cervical cancer screening  - discussion held with the patient about ASCCP guidelines  - repeat pap smear not indicated   Health maintenance  - encouraged to maintain weight at healthy BMI  - discussed importance of exercise and healthy eating  - self breast exam instructions provided   - bilateral screening mammogram recommendations discussed and  order provided    Vaginal irritation  -Vaginitis panel with GC chlamydia swab sent  -Urine culture sent  -Discussed with patient likely related to vaginal atrophy after recent intercourse  -Recommend vaginal estrogen cream 3 times per week  -Recommend vaginal moisturizers and lubricants including coconut oil as needed  -Precautions provided      Problem List Items Addressed This Visit    None  Visit Diagnoses       Vaginal pruritus    -  Primary    Relevant Orders    Vaginitis Vaginosis PCR Panel    Chlamydia/Gc Amplification    Culture Urine    Urinalysis, Routine    Vaginal irritation        Relevant Orders    Culture Urine    Acute vaginitis        Relevant Orders    Culture Urine    Urinalysis, Routine                  All of the findings and plan were discussed with the patient.  She notes understanding and agrees with the plan of care.  All questions were answered to the best of my ability at this time.      RTC in 1 year for a well woman exam or sooner if needed     Felicia Levine MD   [facility] - OBGYN      Discussed with patient that there will not be further notification of normal or benign results other than receiving results on mychart. A [employer] message or telephone call will be placed by the physician and/or office staff if results are abnormal.       Note to patient and family   The 21st Century Cures Act makes medical notes available to patients in the interest of transparency.  However, please be advised that this is a medical document.  It is intended as [id number]-[id number] communication.  It is written and medical language may contain abbreviations or verbiage that are technical and unfamiliar.  It may appear blunt or direct.  Medical documents are intended to carry relevant information, facts as evident, and the clinical opinion of the practitioner.      This note could include assistance by Dragon voice recognition. Errors in content may be related to improper recognition by the system; efforts to review  and correct have been done but errors may still exist.

## 2024-09-05 LAB
BV BACTERIA DNA VAG QL NAA+PROBE: NEGATIVE
C GLABRATA DNA VAG QL NAA+PROBE: NEGATIVE
C KRUSEI DNA VAG QL NAA+PROBE: NEGATIVE
C TRACH DNA SPEC QL NAA+PROBE: NEGATIVE
CANDIDA DNA VAG QL NAA+PROBE: NEGATIVE
N GONORRHOEA DNA SPEC QL NAA+PROBE: NEGATIVE
T VAGINALIS DNA VAG QL NAA+PROBE: NEGATIVE

## 2024-09-09 ENCOUNTER — TELEPHONE (OUTPATIENT)
Dept: OBGYN CLINIC | Facility: CLINIC | Age: 69
End: 2024-09-09

## 2024-09-09 DIAGNOSIS — Z12.31 ENCOUNTER FOR SCREENING MAMMOGRAM FOR MALIGNANT NEOPLASM OF BREAST: Primary | ICD-10-CM

## 2024-09-09 NOTE — TELEPHONE ENCOUNTER
Screening mammogram order pended for you to sign per pt's request.  Pt. seen by Dr. Levine 8/1/24 for WWE .  Thanks

## 2024-09-09 NOTE — TELEPHONE ENCOUNTER
Patient was seen last week and there was supposed to be a mammogram ordered.  Please put in order

## 2024-09-16 ENCOUNTER — HOSPITAL ENCOUNTER (OUTPATIENT)
Dept: MAMMOGRAPHY | Facility: HOSPITAL | Age: 69
Discharge: HOME | End: 2024-09-16
Attending: INTERNAL MEDICINE
Payer: MEDICARE

## 2024-09-16 DIAGNOSIS — Z12.31 ENCOUNTER FOR SCREENING MAMMOGRAM FOR MALIGNANT NEOPLASM OF BREAST: ICD-10-CM

## 2024-09-16 PROCEDURE — 77063 BREAST TOMOSYNTHESIS BI: CPT | Performed by: OBSTETRICS & GYNECOLOGY

## 2024-09-16 PROCEDURE — 77067 SCR MAMMO BI INCL CAD: CPT | Performed by: OBSTETRICS & GYNECOLOGY

## 2024-09-23 ENCOUNTER — OFFICE VISIT (OUTPATIENT)
Dept: UROLOGY | Facility: CLINIC | Age: 69
End: 2024-09-23
Attending: OBSTETRICS & GYNECOLOGY
Payer: MEDICARE

## 2024-09-23 VITALS — HEIGHT: 62 IN | WEIGHT: 137 LBS | BODY MASS INDEX: 25.21 KG/M2 | RESPIRATION RATE: 18 BRPM

## 2024-09-23 DIAGNOSIS — N95.2 POSTMENOPAUSAL ATROPHIC VAGINITIS: ICD-10-CM

## 2024-09-23 DIAGNOSIS — N94.10 DYSPAREUNIA, FEMALE: Primary | ICD-10-CM

## 2024-09-23 DIAGNOSIS — Z98.890 POST-OPERATIVE STATE: ICD-10-CM

## 2024-09-23 DIAGNOSIS — N81.84 PELVIC MUSCLE WASTING: ICD-10-CM

## 2024-09-23 PROCEDURE — 99212 OFFICE O/P EST SF 10 MIN: CPT

## 2024-09-23 NOTE — PROGRESS NOTES
She is s/p Post-Op Summary  Procedure Date: 09/23/21  Procedure Name: Anterior/Posterior/Enterocele Repair;Uterosacral Ligament Suspension;Mid-urethral Sling;Cystoscopy  Post-Op Symptoms: Patient denies pain, LUIS, UUI, prolapse symptoms, nausea/vomitting, fevers/chills, bleeding, voiding dysfunction, and defecatory dysfunction.  Do you feel your surgery was successful?: Moderately successful  Compared to before surgery are you?: Much better  If you could go back to before your surgery, would you do it all over again?: Definitely yes  How satisfied are you with the results of your surgery?: Somewhat satisfied       Doing well   Rare UUI complaints  Voids freely  No UTIs  BMs reg  Some Pain, dyspareunia persists  Recently back to coitus & c/o pain     Vag estrogen 2-3x/weekly  No leakage w/ stress regularly  Daily pelvic exercises  Vag moisturizers  Happy  Wants to pursue PT for intercourse sx    Resp 18   Ht 62\"   Wt 137 lb (62.1 kg)   BMI 25.06 kg/m²     Gen: NAD  CV: RRR  Pulm:nl effort  Abd: soft  : tolerated vaginal exam. Suture site well healed. No active bleeding. Good support  PROLAPSE ASSESSMENT SCALE:                                                 Aa:-3 Ba:-3 C:-9   gh: pb: tvl:9   Ap:-3 Bp:-3 D:         Dyspareunia - vag moisturizers, lube w/ coitus, PFPT  F/u after PT    Reviewed bowel management  Discussed return to work/activity plans, daily pelvic exercises    Discussed mgmt of vulvovaginal atrophy with vaginal estrogen cream. Reviewed associated benefits, risks, alternatives, and goals. Recommend low dose twice weekly mgmt   Vag estrogen twice weekly    Call with s/sx of UTI    Plan for follow up in 9/2025, sooner prn    All questions answered  She understands and agrees to plan    Martha Sellers, DO

## 2024-11-04 ENCOUNTER — APPOINTMENT (OUTPATIENT)
Dept: PHYSICAL THERAPY | Facility: HOSPITAL | Age: 69
End: 2024-11-04
Attending: OBSTETRICS & GYNECOLOGY
Payer: MEDICARE

## 2024-11-13 ENCOUNTER — OFFICE VISIT (OUTPATIENT)
Dept: PHYSICAL THERAPY | Facility: HOSPITAL | Age: 69
End: 2024-11-13
Attending: OBSTETRICS & GYNECOLOGY
Payer: MEDICARE

## 2024-11-13 DIAGNOSIS — N94.10 DYSPAREUNIA, FEMALE: Primary | ICD-10-CM

## 2024-11-13 PROCEDURE — 97112 NEUROMUSCULAR REEDUCATION: CPT

## 2024-11-13 PROCEDURE — 97110 THERAPEUTIC EXERCISES: CPT

## 2024-11-13 PROCEDURE — 97161 PT EVAL LOW COMPLEX 20 MIN: CPT

## 2024-11-13 NOTE — PROGRESS NOTES
MUSCULOSKELETAL AND PELVIC FLOOR EVALUATION:     Diagnosis:   Dyspareunia, female (N94.10)       Referring Provider: Martha Sellers  Date of Evaluation:    2024    Precautions:  None Next MD visit:   none scheduled  Date of Surgery: n/a     PATIENT SUMMARY   Yamile Marquez is a 69 year old female  who presents to therapy today with complaints of pain with intercourse and some incontinence with urgency.  Had pelvic surgery  for prolapse which pt thinks was a success.   Current symptoms include: pelvic pain and leakage Using estradiol and vaginal moisturizer.  back pain-chronic    Pt describes pain level: current 0/10, best 0/10, worst 6/10-intercourse  No back pain presently-exercising and doing yardwork    Obstetrical/Gynecological history: : 1  Para: 1  Delivery method: vaginal  Occupation/Activities: Functional strength training, yoga, swimming, pilates  PFDI-20: 90.63/300;    Yamile describes prior level of function no pain with intercourse. Pt goals include assess pelvic floor muscles , reduce urgency.  Past medical history was reviewed with Yamile. Significant findings include  has a past medical history of Change in hair (), Decorative tattoo (), Diverticulosis, Dyspareunia, Easy bruising (), Eye disease (dry eye disease), Family history of breast cancer, H/O mammogram (2013), High blood pressure, High cholesterol (), Hypertension, OAB (overactive bladder), MANSOOR (obstructive sleep apnea) (6/15/2021 [facility]), Pap smear for cervical cancer screening (2001; 2000), PONV (postoperative nausea and vomiting), Sleep apnea, Unspecified essential hypertension, Urinary incontinence, Visual impairment, and Wears glasses.     URINARY HABITS  Types of symptoms: urge incontinence  Events associated with the onset of urinary complaints: pelvic surgery  Abdominal/Vaginal Pressure complaints: no  Urinary Frequency: every 2 hours  Urine Stream: strong  Amount: normal  Leaking  occurs: Urgency  Episodes of Leakage: 1-2 times per week  Amount of leakage: min  Pad use: no  Nocturia: 1x  Fluid Intake: water  Bladder irritants: 1 cup caffeine  Urine Stop test: able to do this  Post void dribble: no  Hovering: no  Empty bladder just in case: has stopped doing this  Do you ever leak urine without knowing it? no    BOWEL HABITS  Types of symptoms: none   Frequency of bowel movements: 1x-uses squatty potty  Stool consistency: [place] Stool Scale: 3  Do you strain with defecation: No   Laxative use: No    SEXUAL HEALTH STATUS  Sexual [place] Status: active  Pain with initial and/or deep : initial>deep    ASSESSMENT  Yamile presents to physical therapy evaluation with primary c/o pain with intercourse and leakage. The results of the objective tests and measures show  decreased pelvic floor muscle strength and coordination, L hip tightness/deep PFM tightness, decreased core strength .  Functional deficits include but are not limited to embarrassment with leakage .  Signs and symptoms are consistent with diagnosis of Dyspareunia, female (N94.10)    . Pt and PT discussed evaluation findings, pathology, POC and HEP.  Pt voiced understanding and performs HEP correctly without reported pain. Skilled Pelvic Physical Therapy is medically necessary to address the above impairments and reach functional goals.    OBJECTIVE:   Posture: neutral  Pelvic Alignment: symmetrical   Gait: pt ambulates on level ground with normal mechanics.     External Palpation: no tenderness  Scars (location/surgery): none  Abdominal Wall Integrity: no restrictions   Diastasis Recti: no    Range Of Motion  Lumbar AROM screen: wnl  LE AROM screen: grossly WFL     Strength (MMT) 5/5 NOE LE   Transverse Abdominis: 1/5    Flexibility Summary: WFL NOE LE except L>R piriformis tightness    Informed consent for internal pelvic evaluation given: Yes    External Observation:   Voluntary contraction: present   Voluntary relaxation:  present  Involuntary contraction: present  Involuntary relaxation: present    Mons pubis: WNL  Labia majora: WNL  Labia minora: WNL  Urethral meatus: WNL  Introitus: WNL  Perineal body: WNL    Sensory/Reflex:  Vestibule: normal bilaterally  Anal Kinney: Not Tested    Internal Examination   Scar: none    Pelvic Floor Muscle strength: (PERF= Power/Endurance/Reps/Fast) MMT: 2/3/4/4  External Anal Sphincter: nt  Accessory Muscle Use: abdominals    Tissue Laxity Test:  Anterior Wall: WNL  Posterior Wall: WNL  Apical: WNL    Eccentric lengthening contraction: nwl  Bearing down Valsalva maneuver (2-3x): wnl    Internal Palpation: WNL except tenderness L levator ani and piriformis    Today's Treatment and Response:   Patient education was provided on objective findings of external and internal evaluation and expectations with treatment outcomes. Educated on pelvic anatomy and function with diagrams and pelvic model, bladder normatives, adequate hydration levels, proper toileting posture, stress/urge urinary incontinence strategies, and diaphragmatic breathing for PNS activation and pelvic floor relaxation , intercourse strategies-dilator/pelvic wand    Manual therapy/NM Re-ed- internal retraining pelvic floor muscles to learn to perform diaphragmatic breathing , kegel, internal releases L piriformis, (levator ani/piriformis-pain/tightness resolved)     Patient was instructed in and issued a HEP for: diaphragmatic breathing , abdominal bracing , Kegel <> PFM lengthening/diaphragmatic breathing 10 repetitions 3x/day slow    Charges: PT Eval Low Complexity, TE  10  NM 15 (w MT)     Total Timed Treatment: 25 min     Total Treatment Time: 50 min       PLAN OF CARE:    Goals: (to be met in 10 visits)  1. Good knowledge of PFM contraction and relaxation.  2. Independent with HEP including core breath with diaphragmatic breathing and pelvic floor muscles .  3. Pt will have increased transverse abdominis muscle strength to 2/5  to  assist with supporting pelvic floor muscles.   4. Social activity not limited by UI or pain.  5. Resume sexual activity with </=1/10/pain  6. Patient will demonstrate improve PFM isolation, coordination and strength so she is able to reduce urinary urge and prevent leakage during ADL's.    Frequency / Duration: Patient will be seen for 1 x/week or a total of 10 visits over a 90 day period.  Treatment will include: Manual Therapy, Neuromuscular Re-education, Self-Care Home Management, Therapeutic Activities, Therapeutic Exercise, Home Exercise Program instruction, and Modalities as needed    Education or treatment limitation: None  Rehab Potential:good      Patient/Family/Caregiver was advised of these findings, precautions, and treatment options and has agreed to actively participate in planning and for this course of care.    Thank you for your referral. Please co-sign or sign and return this letter via fax as soon as possible to [phone]. If you have any questions, please contact me at Dept: [phone]    Sincerely,  Electronically signed by therapist: Emani Restrepo PT  Physician's certification required: Yes  I certify the need for these services furnished under this plan of treatment and while under my care.    X___________________________________________________ Date____________________    Certification From: 11/13/2024  To:2/11/2025

## 2024-11-20 ENCOUNTER — OFFICE VISIT (OUTPATIENT)
Dept: PHYSICAL THERAPY | Facility: HOSPITAL | Age: 69
End: 2024-11-20
Attending: OBSTETRICS & GYNECOLOGY
Payer: MEDICARE

## 2024-11-20 PROCEDURE — 97112 NEUROMUSCULAR REEDUCATION: CPT

## 2024-11-20 PROCEDURE — 97110 THERAPEUTIC EXERCISES: CPT

## 2024-11-20 NOTE — PROGRESS NOTES
Diagnosis:   Dyspareunia, female (N94.10)       Referring Provider: Martha Sellers  Date of Evaluation:    11/13/2024    Precautions:  latex allergy Next MD visit:   none scheduled  Date of Surgery: n/a   Insurance Primary/Secondary: MEDICARE / BCBS [place] INDEMNITY     # Auth Visits: medicare            Subjective: No intercourse since last visit. Using moisturizer that has been helping to resolve vaginal pain.Doing HEP.    Pain: 0/10 Some times has L low back but no pain today      Objective: Tx flow sheet     Initial evaluation: + updates  URINARY HABITS  Types of symptoms: urge incontinence  Events associated with the onset of urinary complaints: pelvic surgery  Urinary Frequency: every 2 hours  Leaking occurs: Urgency  Episodes of Leakage: 1-2 times per week  Amount of leakage: min  Nocturia: 1x  Empty bladder just in case: has stopped doing this      BOWEL HABITS  Types of symptoms: none   Frequency of bowel movements: 1x-uses squatty potty  Stool consistency: [place] Stool Scale: 3    SEXUAL HEALTH STATUS  Sexual [place] Status: active  Pain with initial and/or deep : initial>deep    Range Of Motion  Lumbar AROM screen: wnl  LE AROM screen: grossly WFL     Strength (MMT) 5/5 NOE LE   Transverse Abdominis: 1/5    Flexibility Summary: WFL NOE LE except L>R piriformis tightness    Informed consent for internal pelvic evaluation given: Yes    External Observation:   Voluntary contraction: present   Voluntary relaxation: present  Involuntary contraction: present  Involuntary relaxation: present    Mons pubis: WNL  Labia majora: WNL  Labia minora: WNL  Urethral meatus: WNL  Introitus: WNL  Perineal body: WNL    Sensory/Reflex:  Vestibule: normal bilaterally      Internal Examination   Pelvic Floor Muscle strength: (PERF= Power/Endurance/Reps/Fast) MMT: 2/3/4/4  Accessory Muscle Use: abdominals    Tissue Laxity Test:  Anterior Wall: WNL  Posterior Wall: WNL  Apical: WNL    Eccentric lengthening contraction:  wnl  Bearing down Valsalva maneuver (2-3x): wnl    Internal Palpation: WNL except tenderness L levator ani and piriformis      Assessment: Improved coordination of pelvic floor muscles via fascially connected mm. Diaphragmatic breathing also improved with manual and verbal retraining. Upgraded HEP.      Goals:   Goals: (to be met in 10 visits)-progressing  1. Good knowledge of PFM contraction and relaxation.  2. Independent with HEP including core breath with diaphragmatic breathing and pelvic floor muscles .  3. Pt will have increased transverse abdominis muscle strength to 2/5  to assist with supporting pelvic floor muscles.   4. Social activity not limited by UI or pain.  5. Resume sexual activity with </=1/10/pain  6. Patient will demonstrate improve PFM isolation, coordination and strength so she is able to reduce urinary urge and prevent leakage during ADL's.    Plan: Continue plan of care as pt tolerates with focus on pelvic floor muscle rehab.   Date: 11/20/2024  TX#: 2/10 Date:                 TX#: 3/ Date:                 TX#: 4/ Date:                 TX#: 5/ Date:   Tx#: 6/   NM   Radha/phys pelvic floor muscles /diaphragm /Transverse abdominis /fascially connected mm education     [place] strategies    Retraining-  diaphragmatic breathing   abdominal bracing   Kegel   W visual cueing and manual cueing         TE  Piriformis stretch R/L x30 sec    Kegel + abdominal bracing +  Iso hip add  1. Supine  2. Pelvic Tilt  3. 4 pt   4. Bridge  5. Standing  6. Fxnl squat  10x ea     Kegel +  Resisted   ER RTB  x10     Kegel +  U/LE lift  x10     Issued HEP for above exs  Latex free RTB         HEP: diaphragmatic breathing , abdominal bracing , Kegel <> PFM lengthening/diaphragmatic breathing 10 repetitions 3x/day slow, + additions above     Charges: NM 15 TE2 30       Total Timed Treatment: 45 min  Total Treatment Time: 45 min

## 2024-11-27 ENCOUNTER — OFFICE VISIT (OUTPATIENT)
Dept: PHYSICAL THERAPY | Facility: HOSPITAL | Age: 69
End: 2024-11-27
Attending: OBSTETRICS & GYNECOLOGY
Payer: MEDICARE

## 2024-11-27 PROCEDURE — 97112 NEUROMUSCULAR REEDUCATION: CPT

## 2024-11-27 PROCEDURE — 97110 THERAPEUTIC EXERCISES: CPT

## 2024-11-27 NOTE — PROGRESS NOTES
Diagnosis:   Dyspareunia, female (N94.10)       Referring Provider: Martha Sellers  Date of Evaluation:    11/13/2024    Precautions:  latex allergy Next MD visit:   none scheduled  Date of Surgery: n/a   Insurance Primary/Secondary: MEDICARE / [place]BS [place] INDEMNITY     # Auth Visits: medicare            Subjective: Less pelvic floor pain. Doing HEP. diaphragmatic breathing helping a lot.    Pain: 0/10 Some times has L low back but no pain today      Objective: Tx flow sheet     Initial evaluation: + updates  URINARY HABITS  Types of symptoms: urge incontinence  Events associated with the onset of urinary complaints: pelvic surgery  Urinary Frequency: every 2 hours  Leaking occurs: Urgency  Episodes of Leakage: 1-2 times per week  Amount of leakage: min  Nocturia: 1x  Empty bladder just in case: has stopped doing this      BOWEL HABITS  Types of symptoms: none   Frequency of bowel movements: 1x-uses squatty potty  Stool consistency: [place] Stool Scale: 3    SEXUAL HEALTH STATUS  Sexual [place] Status: active  Pain with initial and/or deep : initial>deep    Range Of Motion  Lumbar AROM screen: wnl  LE AROM screen: grossly WFL     Strength (MMT) 5/5 NOE LE   Transverse Abdominis: 1/5    Flexibility Summary: WFL NOE LE except L>R piriformis tightness    Informed consent for internal pelvic evaluation given: Yes    External Observation:   Voluntary contraction: present   Voluntary relaxation: present  Involuntary contraction: present  Involuntary relaxation: present    Mons pubis: WNL  Labia majora: WNL  Labia minora: WNL  Urethral meatus: WNL  Introitus: WNL  Perineal body: WNL    Sensory/Reflex:  Vestibule: normal bilaterally      Internal Examination   Pelvic Floor Muscle strength: (PERF= Power/Endurance/Reps/Fast) MMT: 2/3/4/4  Accessory Muscle Use: abdominals    Tissue Laxity Test:  Anterior Wall: WNL  Posterior Wall: WNL  Apical: WNL    Eccentric lengthening contraction: wnl  Bearing down Valsalva maneuver  (2-3x): wnl    Internal Palpation: WNL except tenderness L levator ani and piriformis      Assessment: Improved progression of coordination of pelvic floor muscles via fascially connected mm. Diaphragmatic breathing also improved with manual and verbal retraining. Pt appeared to further understand bladder fitness and how to isolate pelvic floor muscles and connecting mm.Handouts issued for improved understanding.      Goals:   Goals: (to be met in 10 visits)-progressing  1. Good knowledge of PFM contraction and relaxation.  2. Independent with HEP including core breath with diaphragmatic breathing and pelvic floor muscles .  3. Pt will have increased transverse abdominis muscle strength to 2/5  to assist with supporting pelvic floor muscles.   4. Social activity not limited by UI or pain.  5. Resume sexual activity with </=1/10/pain  6. Patient will demonstrate improve PFM isolation, coordination and strength so she is able to reduce urinary urge and prevent leakage during ADL's.    Plan: Continue plan of care as pt tolerates with focus on pelvic floor muscle rehab.   Date: 11/20/2024  TX#: 2/10 Date:           11/27/2024       TX#: 3/ Date:                 TX#: 4/ Date:                 TX#: 5/ Date:   Tx#: 6/   NM   Radha/phys pelvic floor muscles /diaphragm /Transverse abdominis /fascially connected mm education     [place] strategies    Retraining-  diaphragmatic breathing   abdominal bracing   Kegel   W visual cueing and manual cueing   NM   Radha/phys pelvic floor muscles /diaphragm /Transverse abdominis /fascially connected mm education     Bladder fitness-handout    Pelvic brace-  Explored isolation  pelvic floor muscles   Transverse abdominis -  handout      TE  Piriformis stretch R/L x30 sec    Kegel + abdominal bracing +  Iso hip add  1. Supine  2. Pelvic Tilt  3. 4 pt   4. Bridge  5. Standing  6. Fxnl squat  10x ea     Kegel +  Resisted   ER RTB  x10     Kegel +  U/LE lift  x10     Issued HEP for above  exs  Latex free RTB   TE    Kegel +  iso hip add  10\" x10     Kegel + articulating bridge x10 w isometric adduction     Kegel + clamshell RTB x10 R/L      Kegel +  Resisted   ER RTB  x10     Kegel +  U/LE lift  x10     Kegel with plie/ER resisted RTB x10     HEP issued for above    W abdominal bracing for above exs      HEP: diaphragmatic breathing , abdominal bracing , Kegel <> PFM lengthening/diaphragmatic breathing 10 repetitions 3x/day slow, + additions above     Charges: NM 15 TE2 30       Total Timed Treatment: 45 min  Total Treatment Time: 45 min

## 2024-12-04 ENCOUNTER — APPOINTMENT (OUTPATIENT)
Dept: CT IMAGING | Facility: HOSPITAL | Age: 69
End: 2024-12-04
Attending: EMERGENCY MEDICINE
Payer: MEDICARE

## 2024-12-04 ENCOUNTER — APPOINTMENT (OUTPATIENT)
Dept: PHYSICAL THERAPY | Facility: HOSPITAL | Age: 69
End: 2024-12-04
Attending: OBSTETRICS & GYNECOLOGY
Payer: MEDICARE

## 2024-12-04 ENCOUNTER — HOSPITAL ENCOUNTER (EMERGENCY)
Facility: HOSPITAL | Age: 69
Discharge: HOME | End: 2024-12-04
Attending: EMERGENCY MEDICINE
Payer: MEDICARE

## 2024-12-04 VITALS
SYSTOLIC BLOOD PRESSURE: 137 MMHG | DIASTOLIC BLOOD PRESSURE: 86 MMHG | HEART RATE: 65 BPM | TEMPERATURE: 98 F | RESPIRATION RATE: 18 BRPM | OXYGEN SATURATION: 100 % | WEIGHT: 141.75 LBS | BODY MASS INDEX: 26 KG/M2

## 2024-12-04 DIAGNOSIS — K61.1 PERIRECTAL ABSCESS: Primary | ICD-10-CM

## 2024-12-04 LAB
ALBUMIN SERPL-MCNC: 4 G/DL (ref 3.2–4.8)
ALBUMIN/GLOB SERPL: 1.5 {RATIO} (ref 1–2)
ALP LIVER SERPL-CCNC: 46 U/L
ALT SERPL-CCNC: 23 U/L
ANION GAP SERPL CALC-SCNC: 6 MMOL/L (ref 0–18)
AST SERPL-CCNC: 24 U/L (ref ?–34)
BASOPHILS # BLD AUTO: 0.02 X10(3) UL (ref 0–0.2)
BASOPHILS NFR BLD AUTO: 0.3 %
BILIRUB SERPL-MCNC: 0.6 MG/DL (ref 0.2–1.1)
BUN BLD-MCNC: 16 MG/DL (ref 9–23)
CALCIUM BLD-MCNC: 10.1 MG/DL (ref 8.7–10.4)
CHLORIDE SERPL-SCNC: 104 MMOL/L (ref 98–112)
CO2 SERPL-SCNC: 28 MMOL/L (ref 21–32)
CREAT BLD-MCNC: 0.86 MG/DL
EGFRCR SERPLBLD CKD-EPI 2021: 72 ML/MIN/1.73M2 (ref 60–?)
EOSINOPHIL # BLD AUTO: 0.17 X10(3) UL (ref 0–0.7)
EOSINOPHIL NFR BLD AUTO: 2.5 %
ERYTHROCYTE [DISTWIDTH] IN BLOOD BY AUTOMATED COUNT: 12.9 %
GLOBULIN PLAS-MCNC: 2.7 G/DL (ref 2–3.5)
GLUCOSE BLD-MCNC: 96 MG/DL (ref 70–99)
HCT VFR BLD AUTO: 40 %
HGB BLD-MCNC: 13.7 G/DL
IMM GRANULOCYTES # BLD AUTO: 0.01 X10(3) UL (ref 0–1)
IMM GRANULOCYTES NFR BLD: 0.1 %
LYMPHOCYTES # BLD AUTO: 3.07 X10(3) UL (ref 1–4)
LYMPHOCYTES NFR BLD AUTO: 44.8 %
MCH RBC QN AUTO: 30.8 PG (ref 26–34)
MCHC RBC AUTO-ENTMCNC: 34.3 G/DL (ref 31–37)
MCV RBC AUTO: 89.9 FL
MONOCYTES # BLD AUTO: 0.61 X10(3) UL (ref 0.1–1)
MONOCYTES NFR BLD AUTO: 8.9 %
NEUTROPHILS # BLD AUTO: 2.98 X10 (3) UL (ref 1.5–7.7)
NEUTROPHILS # BLD AUTO: 2.98 X10(3) UL (ref 1.5–7.7)
NEUTROPHILS NFR BLD AUTO: 43.4 %
OSMOLALITY SERPL CALC.SUM OF ELEC: 287 MOSM/KG (ref 275–295)
PLATELET # BLD AUTO: 294 10(3)UL (ref 150–450)
POTASSIUM SERPL-SCNC: 4.5 MMOL/L (ref 3.5–5.1)
PROT SERPL-MCNC: 6.7 G/DL (ref 5.7–8.2)
RBC # BLD AUTO: 4.45 X10(6)UL
SODIUM SERPL-SCNC: 138 MMOL/L (ref 136–145)
WBC # BLD AUTO: 6.9 X10(3) UL (ref 4–11)

## 2024-12-04 PROCEDURE — 74177 CT ABD & PELVIS W/CONTRAST: CPT | Performed by: EMERGENCY MEDICINE

## 2024-12-04 PROCEDURE — 80053 COMPREHEN METABOLIC PANEL: CPT | Performed by: EMERGENCY MEDICINE

## 2024-12-04 PROCEDURE — 85025 COMPLETE CBC W/AUTO DIFF WBC: CPT | Performed by: EMERGENCY MEDICINE

## 2024-12-04 PROCEDURE — 99284 EMERGENCY DEPT VISIT MOD MDM: CPT

## 2024-12-04 PROCEDURE — 36415 COLL VENOUS BLD VENIPUNCTURE: CPT

## 2024-12-04 RX ORDER — CEPHALEXIN 500 MG/1
500 CAPSULE ORAL 3 TIMES DAILY
Qty: 21 CAPSULE | Refills: 0 | Status: SHIPPED | OUTPATIENT
Start: 2024-12-04 | End: 2024-12-11

## 2024-12-04 RX ORDER — CEPHALEXIN 500 MG/1
500 CAPSULE ORAL ONCE
Status: COMPLETED | OUTPATIENT
Start: 2024-12-04 | End: 2024-12-04

## 2024-12-04 NOTE — ED INITIAL ASSESSMENT (HPI)
Patient to the ER c/o abscess near buttocks. Patient states it is draining blood and pus. Hx of abscesses. Painful to touch.

## 2024-12-05 NOTE — ED PROVIDER NOTES
Patient Seen in: [facility] Emergency Department      History     Chief Complaint   Patient presents with    Abscess     Stated Complaint: boil on butt that is oozing, denies fevers    Subjective:   69-year-old female, presents with perirectal abscess.  This noticed couple days ago and is draining some purulence and bloody discharge.  States she has had boils before, someone her butt and some in her perineal region.  She not diabetic.  Not on blood thinners.  States this 1 is getting better, not super painful but near the rectum so she came in for evaluation.  No problems defecating.  No fever.  Not diabetic.  Not immunocompromise              Objective:     Past Medical History:    Change in hair    thinning hair - ridges in nails    Decorative tattoo    Diverticulosis    Dyspareunia    Easy bruising    Eye disease    Family history of breast cancer    H/O mammogram    benign    High blood pressure    High cholesterol    taking enalapril and cholesterol is now at good range    Hypertension    OAB (overactive bladder)    MANSOOR (obstructive sleep apnea)    AHI 5.2  REM AHI 35.2 Supine AHI 10.7 non-supine AHI 0 Sao2 Paulie     Pap smear for cervical cancer screening    WNL    PONV (postoperative nausea and vomiting)    Sleep apnea    uses cpap    Unspecified essential hypertension    Urinary incontinence    Visual impairment    glasses    Wears glasses              Past Surgical History:   Procedure Laterality Date    Anterior repair  09/23/2021    Colonoscopy  2012    last one was done in 2012    Colonoscopy  2023    return in 7 years per patient    Eye surgery      Crossed eyed surgery as young child    Hand/finger surgery unlisted  05/2012    middle right - no metal    Hysterectomy  03/06/2000    [facility]    Edson biopsy stereo nodule 1 site right (cpt=19081) Right 01/2020    benign.    Midurethral sling  09/23/2021    Oophorectomy Left 11/30/2016    left salpingooopherectomy    Posterior repair  09/23/2021     Tonsillectomy      Total abdom hysterectomy      Uterosacral ligament suspension  2021                Social History     Socioeconomic History    Marital status:    Tobacco Use    Smoking status: Former     Current packs/day: 0.00     Average packs/day: 1 pack/day for 20.0 years (20.0 ttl pk-yrs)     Types: Cigarettes     Start date: 1967     Quit date: 1987     Years since quittin.7    Smokeless tobacco: Never   Vaping Use    Vaping status: Never Used   Substance and Sexual Activity    Alcohol use: Yes     Alcohol/week: 6.0 standard drinks of alcohol     Types: 6 Glasses of wine per week    Drug use: No    Sexual activity: Yes     Partners: Male   Other Topics Concern    Caffeine Concern Yes     Comment: coffee     Exercise Yes     Comment: trying everyday     Seat Belt Yes                  Physical Exam     ED Triage Vitals [24 1618]   BP (!) 166/82   Pulse 77   Resp 16   Temp 98.1 °F (36.7 °C)   Temp src Oral   SpO2 99 %   O2 Device None (Room air)       Current Vitals:   Vital Signs  BP: 137/86  Pulse: 65  Resp: 18  Temp: 98.1 °F (36.7 °C)  Temp src: Oral  MAP (mmHg): (!) 102    Oxygen Therapy  SpO2: 100 %  O2 Device: None (Room air)        Physical Exam  Vitals and nursing note reviewed.   Constitutional:       Appearance: She is not toxic-appearing.   HENT:      Head: Normocephalic.   Cardiovascular:      Rate and Rhythm: Normal rate.      Heart sounds: Normal heart sounds.   Pulmonary:      Effort: Pulmonary effort is normal. No respiratory distress.      Breath sounds: Normal breath sounds.   Abdominal:      Palpations: Abdomen is soft.   Musculoskeletal:         General: Normal range of motion.      Cervical back: Neck supple.   Skin:     General: Skin is warm and dry.   Neurological:      General: No focal deficit present.      Mental Status: She is alert.   Psychiatric:         Mood and Affect: Mood normal.         Behavior: Behavior normal.       Patient with a small  area of fluctuance around the 11 5 position of the perirectal region.  No cellulitis.  No gangrene.  No crepitus.  Not really tender.  Minimally fluctuant, no induration.  No rectal pain.    ED Course     Labs Reviewed   COMP METABOLIC PANEL (14) - Abnormal; Notable for the following components:       Result Value    Alkaline Phosphatase 46 (*)     All other components within normal limits   CBC WITH DIFFERENTIAL WITH PLATELET                   MDM      CT ABDOMEN+PELVIS(CONTRAST ONLY)(CPT=74177)    Result Date: 12/4/2024  CONCLUSION:  Probable proctitis with tissue thickening and stranding around the distal rectum/anal region, but no defined abscess formation or other fluid collection or gas collection is identified.   LOCATION:  [facility]   Dictated by (CST): Vasquez Palmer MD on 12/04/2024 at 8:41 PM     Finalized by (CST): Vasquez Palmer MD on 12/04/2024 at 8:44 PM        I independent interpreted the CT abdomen pelvis without any obvious drainable fluid collection    Differential diagnosis includes, but not limited to, perirectal abscess, phlegmon, cellulitis, gangrene, hemorrhoid    External chart review demonstrates outpatient general practice visit about a month ago    69-year-old female with concern for peritoneal abscess.  There is slight area of fluctuance as noted above.  Do not believe that is drainable at this time.  Also pretty close to the rectum and discussed the sphincter involvement.  CTA was therefore obtained and the above findings.  Talked her about this.  She will do warm soaks, put her on Keflex.  Given colorectal follow-up.  She is well-appearing, agreeable to this plan, did not want I&D although I do not believe is in her best interest at this time.  She is agreeable, strict return precaution provided, shared decision made utilized, discharged home at her request.  Does not want anything for pain    Patient was screened and evaluated during this visit.  As the treating physician attending to  the patient, I determined within reasonable clinical confidence and prior to discharge, that an emergency medical condition was not or was no longer present.  There was no indication for further evaluation, treatment, or admission on an emergency basis.  Comprehensive verbal and written discharge and follow-up instructions were provided to help prevent relapse or worsening.  Patient was instructed to follow-up with their primary care provider for further evaluation and treatment, return immediately to ER for worsening, concerning, new, or changing/persisting symptoms. I discussed the case with the patient and they had no questions, complaints, or concerns.  Patient was comfortable going home.     Per the discharge paperwork, patients are encouraged to and given instructions on how to sign up for MyChart, where they have access to their records, including any/all incidental findings.     This note was prepared using Dragon Medical voice recognition dictation software. As a result errors may occur. When identified these errors have been corrected. While every attempt is made to correct errors during dictation discrepancies may still exist    Note to patient: The 21st Century Cures Act makes medical notes like these available to patients in the interest of transparency. However, this is a medical document intended as peer to peer communication. It is written in medical language and may contain abbreviations or verbiage that are unfamiliar. It may appear blunt or direct. Medical documents are intended to carry relevant information, facts as evident, and the clinical opinion of the practitioner.           Medical Decision Making      Disposition and Plan     Clinical Impression:  1. Perirectal abscess         Disposition:  Discharge  12/4/2024  8:57 pm    Follow-up:  Brayden Rivera MD  [address] [facility] [address]  [phone]    Follow up  As needed          Medications Prescribed:  Discharge Medication  List as of 12/4/2024  9:03 PM        START taking these medications    Details   cephALEXin 500 MG Oral Cap Take 1 capsule (500 mg total) by mouth 3 (three) times daily for 7 days., Normal, Disp-21 capsule, R-0                 Supplementary Documentation:

## 2024-12-11 ENCOUNTER — OFFICE VISIT (OUTPATIENT)
Facility: LOCATION | Age: 69
End: 2024-12-11
Payer: MEDICARE

## 2024-12-11 ENCOUNTER — OFFICE VISIT (OUTPATIENT)
Dept: PHYSICAL THERAPY | Facility: HOSPITAL | Age: 69
End: 2024-12-11
Attending: OBSTETRICS & GYNECOLOGY
Payer: MEDICARE

## 2024-12-11 VITALS
HEIGHT: 62 IN | DIASTOLIC BLOOD PRESSURE: 76 MMHG | BODY MASS INDEX: 25.95 KG/M2 | TEMPERATURE: 98 F | WEIGHT: 141 LBS | SYSTOLIC BLOOD PRESSURE: 125 MMHG | HEART RATE: 61 BPM

## 2024-12-11 DIAGNOSIS — L73.2 ACNE INVERSA: Primary | ICD-10-CM

## 2024-12-11 PROCEDURE — 97110 THERAPEUTIC EXERCISES: CPT

## 2024-12-11 PROCEDURE — 99202 OFFICE O/P NEW SF 15 MIN: CPT | Performed by: STUDENT IN AN ORGANIZED HEALTH CARE EDUCATION/TRAINING PROGRAM

## 2024-12-11 PROCEDURE — 97112 NEUROMUSCULAR REEDUCATION: CPT

## 2024-12-11 NOTE — PROGRESS NOTES
Diagnosis:   Dyspareunia, female (N94.10)       Referring Provider: Martha Sellers  Date of Evaluation:    11/13/2024    Precautions:  latex allergy Next MD visit:   none scheduled  Date of Surgery: n/a   Insurance Primary/Secondary: MEDICARE / [place] INDEMNITY     # Auth Visits: medicare            Subjective: No intercourse past week due to abscess around anus. Gets strong urge when bladder is full. Frequency 1 1/2- 2 hours. Supine kegel's feel stronger vs standing.   Some times has increased urgency when walking at home on the treadmill but doesn't feel urgency when working out.     Pain: 0/10 Some times has L low back but no pain today      Objective: Tx flow sheet     Biofeedback: Recruitment good, holding ability good, derecruitment good, baseline between contractions 2.0-3.5Ua, baseline resting average 2UaUa (normal 2.0Ua)      Initial evaluation: + updates  URINARY HABITS  Types of symptoms: urge incontinence  Events associated with the onset of urinary complaints: pelvic surgery  Urinary Frequency: every 2 hours  Leaking occurs: Urgency  Episodes of Leakage: 1-2 times per week  Amount of leakage: min  Nocturia: 1x  Empty bladder just in case: has stopped doing this      BOWEL HABITS  Types of symptoms: none   Frequency of bowel movements: 1x-uses squatty potty  Stool consistency: [place] Stool Scale: 3    SEXUAL HEALTH STATUS  Sexual [place] Status: active  Pain with initial and/or deep : initial>deep    Range Of Motion  Lumbar AROM screen: wnl  LE AROM screen: grossly WFL     Strength (MMT) 5/5 NOE LE   Transverse Abdominis: 1/5    Flexibility Summary: WFL NOE LE except L>R piriformis tightness    Informed consent for internal pelvic evaluation given: Yes    External Observation:   Voluntary contraction: present   Voluntary relaxation: present  Involuntary contraction: present  Involuntary relaxation: present    Mons pubis: WNL  Labia majora: WNL  Labia minora: WNL  Urethral meatus:  WNL  Introitus: WNL  Perineal body: WNL    Sensory/Reflex:  Vestibule: normal bilaterally      Internal Examination   Pelvic Floor Muscle strength: (PERF= Power/Endurance/Reps/Fast) MMT: 2/3/4/4  Accessory Muscle Use: abdominals    Tissue Laxity Test:  Anterior Wall: WNL  Posterior Wall: WNL  Apical: WNL    Eccentric lengthening contraction: wnl  Bearing down Valsalva maneuver (2-3x): wnl    Internal Palpation: WNL except tenderness L levator ani and piriformis      Assessment: Improved progression of coordination of pelvic floor muscles via biofeedback isolated and with sim activities of daily living . Verbal cueing for correct sequencing of pelvic floor muscle contractions with diaphragmatic breathing . Pt appeared to further understand pressure management and activation of PNS to reduce urgency.     Goals:   Goals: (to be met in 10 visits)-progressing  1. Good knowledge of PFM contraction and relaxation.  2. Independent with HEP including core breath with diaphragmatic breathing and pelvic floor muscles .  3. Pt will have increased transverse abdominis muscle strength to 2/5  to assist with supporting pelvic floor muscles.   4. Social activity not limited by UI or pain.  5. Resume sexual activity with </=1/10/pain  6. Patient will demonstrate improve PFM isolation, coordination and strength so she is able to reduce urinary urge and prevent leakage during ADL's.    Plan: Continue plan of care as pt tolerates with focus on pelvic floor muscle rehab.   Date: 11/20/2024  TX#: 2/10 Date:           11/27/2024       TX#: 3/ Date:       12/11/2024           TX#: 4/ Date:                 TX#: 5/ Date:   Tx#: 6/   NM   Radha/phys pelvic floor muscles /diaphragm /Transverse abdominis /fascially connected mm education     [place] strategies    Retraining-  diaphragmatic breathing   abdominal bracing   Trevor SANCHEZ visual cueing and manual cueing   NM   Radha/phys pelvic floor muscles /diaphragm /Transverse abdominis  /fascially connected mm education     Bladder fitness-handout    Pelvic brace-  Explored isolation  pelvic floor muscles   Transverse abdominis -  handout NM   Radha/phys pelvic floor muscles /diaphragm /Transverse abdominis /fascially connected mm education     Bladder fitness-reviewed    Pelvic brace-  Explored isolation  pelvic floor muscles   Transverse abdominis     biofeedback  external sensors were placed and leads were attached  Resting tone  Tonic  Phasic  [place]  With coordinated breathing    kegel +  *supine  *Sit  *March  *Stand  *sit<>stand     Biofeedback with constant interpretation of results.      TE  Piriformis stretch R/L x30 sec    Kegel + abdominal bracing +  Iso hip add  1. Supine  2. Pelvic Tilt  3. 4 pt   4. Bridge  5. Standing  6. Fxnl squat  10x ea     Kegel +  Resisted   ER RTB  x10     Kegel +  U/LE lift  x10     Issued HEP for above exs  Latex free RTB   TE    Kegel +  iso hip add  10\" x10     Kegel + articulating bridge x10 w isometric adduction     Kegel + clamshell RTB x10 R/L      Kegel +  Resisted   ER RTB  x10     Kegel +  U/LE lift  x10     Kegel with plie/ER resisted RTB x10     HEP issued for above    W abdominal bracing for above exs TE    Kegel  diaphragmatic breathing   abdominal bracing   w biofeedback     HEP: diaphragmatic breathing , abdominal bracing , Kegel <> PFM lengthening/diaphragmatic breathing 10 repetitions 3x/day slow, + additions above     Charges: NM2  TE 15     Total Timed Treatment: 45 min  Total Treatment Time: 45 min

## 2024-12-11 NOTE — H&P
Patient ID: Yamile Marquez is a 69 year old female.    Chief Complaint   Patient presents with    New Patient     NP - f/u 12/4 ED for perianal abscess, no symptoms.               HPI: Yamile Marquez is a 69 year old female presents to clinic for evaluation.  Patient was in the emergency room approximately 1 week ago for perianal abscess.  She was evaluated but did not undergo a incision and drainage.  She was started on antibiotics.  She was referred to general surgery for further evaluation.  Today, patient denies any issues.  She denies any fevers or chills.  She has not seen any drainage.  Today is her last day of antibiotics.    Workup: None      Past Medical History  Past Medical History:    Change in hair    thinning hair - ridges in nails    Decorative tattoo    Diverticulosis    Diverticulosis of intestine    Dyspareunia    Easy bruising    Eye disease    Family history of breast cancer    H/O mammogram    benign    High blood pressure    High cholesterol    taking enalapril and cholesterol is now at good range    Hyperlipidemia    taking medication    Hypertension    OAB (overactive bladder)    MANSOOR (obstructive sleep apnea)    AHI 5.2  REM AHI 35.2 Supine AHI 10.7 non-supine AHI 0 Sao2 Paulie     Pap smear for cervical cancer screening    WNL    PONV (postoperative nausea and vomiting)    Skin cancer    Basal cell on nose removed with mohs surgery    Sleep apnea    uses cpap    Unspecified essential hypertension    Urinary incontinence    Visual impairment    glasses    Wears glasses       Past Surgical History  Past Surgical History:   Procedure Laterality Date    Anterior repair  09/23/2021    Breast biopsy      Benign    Cataract  05/08/2019    Only left eye    Colonoscopy  2012    last one was done in 2012    Colonoscopy  2023    return in 7 years per patient    Eye surgery      Crossed eyed surgery as young child    Hand/finger surgery unlisted  05/2012    middle right - no metal    Hysterectomy   2000    [facility] biopsy stereo nodule 1 site right (cpt=19081) Right 2020    benign.    Midurethral sling  2021      1981    Oophorectomy Left 2016    left salpingooopherectomy    Posterior repair  2021    Skin surgery  2022    Tonsillectomy      Total abdom hysterectomy      Uterosacral ligament suspension  2021       Medications  Current Outpatient Medications   Medication Sig Dispense Refill    cephALEXin 500 MG Oral Cap Take 1 capsule (500 mg total) by mouth 3 (three) times daily for 7 days. 21 capsule 0    rosuvastatin 40 MG Oral Tab Take 1 tablet (40 mg total) by mouth nightly.      clobetasol 0.05 % External Cream       estradiol 0.1 MG/GM Vaginal Cream Apply 1/2 gram vaginally 2 times per week. 42.5 g 3    alendronate 70 MG Oral Tab Take 1 tablet (70 mg total) by mouth every 7 days.      RESTASIS 0.05 % Ophthalmic Emulsion Place 1 drop into both eyes in the morning and 1 drop before bedtime.      Glycerin PF (OASIS TEARS PF) 0.22 % Ophthalmic Solution Place 1 drop into both eyes every morning.      CALCIUM CITRATE OR Take 500 mg by mouth daily.      Coenzyme Q10 (COQ10) 200 MG Oral Cap Take 1 capsule by mouth daily.      Multiple Vitamins-Minerals (PRESERVISION AREDS) Oral Tab Take 1 tablet by mouth daily.      Collagen-Boron-Hyaluronic Acid (MOVE FREE ULTRA JOINT HEALTH) 40-5-3.3 MG Oral Tab Take 1 tablet by mouth daily.      OMEGA-3 FATTY ACIDS OR Take 1,000 mg by mouth daily.      Probiotic Product (PROBIOTIC OR) Take 1 capsule by mouth daily.      TURMERIC OR Take by mouth daily.      aspirin 81 MG Oral Tab Take 1 tablet (81 mg total) by mouth daily. 30 tablet 3    Enalapril Maleate (VASOTEC) 20 MG Oral Tab Take 1 tablet (20 mg total) by mouth daily.         Allergies  Allergies[1]    Social History  History   Smoking Status    Former    Types: Cigarettes   Smokeless Tobacco    Never     History   Alcohol Use    6.0 standard drinks of alcohol/week     6 Glasses of wine per week     History   Drug Use No       Family History  Family History   Problem Relation Age of Onset    Breast Cancer Mother 55        mid 50's    Hypertension Mother     Cancer Mother         lung    Cancer Father         Lukemia    Hypertension Brother     Heart Disorder Brother         Hardning of Arterys    Heart Disorder Maternal Grandfather         Hardning of the Artery    Stroke Paternal Grandfather     Cancer Paternal Aunt         colon    Colon Cancer Paternal Aunt        Review of Systems  Review of Systems   Constitutional: Negative.    Respiratory: Negative.     Cardiovascular: Negative.    Gastrointestinal:  Positive for rectal pain. Negative for blood in stool, constipation and diarrhea.       Exam  Vitals:    12/11/24 1010   BP: 125/76   Pulse: 61   Temp: 98.1 °F (36.7 °C)     Physical Exam  Exam conducted with a chaperone present.   Constitutional:       Appearance: Normal appearance.   Cardiovascular:      Rate and Rhythm: Normal rate.   Pulmonary:      Effort: Pulmonary effort is normal.   Genitourinary:      Skin:     General: Skin is warm and dry.   Neurological:      Mental Status: She is alert and oriented to person, place, and time.           Assessment/Plan  Assessment   Problem List Items Addressed This Visit    None  Visit Diagnoses       Acne inversa with abscess    -  Primary            Yamile Marquez is a 69 year old female with what appears to be hidradenitis or acne inversa    On physical exam, there is no drainage or erythema consistent with continued active infection  Patient does have what appears to be a type of hidradenitis or acne inversa across her gluteus and at the site in question  No acute surgical intervention at this time  I recommend that she finish out the course of antibiotics  If she has any recurrent symptoms, she can contact my office  Otherwise, she can follow-up as needed    Thank you for letting me participate in the care of this  patient      Laly Gonzalez MD  General Surgery  [facility]     CC:  Jitendra Fernandez MD         [1]   Allergies  Allergen Reactions    Morphine NAUSEA AND VOMITING    Latex ITCHING

## 2024-12-18 ENCOUNTER — APPOINTMENT (OUTPATIENT)
Dept: PHYSICAL THERAPY | Facility: HOSPITAL | Age: 69
End: 2024-12-18
Attending: OBSTETRICS & GYNECOLOGY
Payer: MEDICARE

## 2024-12-23 ENCOUNTER — VIRTUAL PHONE E/M (OUTPATIENT)
Dept: UROLOGY | Facility: CLINIC | Age: 69
End: 2024-12-23
Attending: OBSTETRICS & GYNECOLOGY
Payer: MEDICARE

## 2024-12-23 DIAGNOSIS — N81.84 PELVIC MUSCLE WASTING: ICD-10-CM

## 2024-12-23 DIAGNOSIS — N94.10 DYSPAREUNIA, FEMALE: ICD-10-CM

## 2024-12-23 DIAGNOSIS — N95.2 POSTMENOPAUSAL ATROPHIC VAGINITIS: ICD-10-CM

## 2024-12-23 DIAGNOSIS — N39.41 URGE INCONTINENCE: Primary | ICD-10-CM

## 2024-12-23 NOTE — PROGRESS NOTES
This visit is being conducted as a televisit with patient's consent.  Patient is in a safe, private environment for televisit, provider located in the office setting.    Visit for f/u of PFPT  Has attended 4 sessions with Skye Restrepo, 4 clyde'fredis sessions scheduled    Prior to starting PFPT started home PF program    Reports improvement in urinary frequency  Minimal leaking    Reports improvement in dyspareunia but hasn't been as active this past month 2/2 other health issues  Using vag moisturizer  Using vag estrogen 2x weekly    Bowels reg     Denies any current signs or symptoms of UTI      Impression/Plan:    ICD-10-CM    1. Urge incontinence  N39.41       2. Dyspareunia, female  N94.10       3. Postmenopausal atrophic vaginitis  N95.2       4. Pelvic muscle wasting  N81.84           Treatment Plan:  Continue PFPT  Continue vag estrogen as prescribed  Vag moisturizers 2-3x weekly  Lube with coitus  Bowel regimen  Bladder diet/drill  Pelvic floor exercises  Call with s/sx of UTI    All questions answered  She understands and agrees to plan    Return in about 3 months (around 3/23/2025) for PFPT f/u.    Latoya Claros PA-C    Note to patient: The 21st Century Cures Act makes medical notes like these available to patients in the interest of transparency.  However, be advised this is a medical document.  It is intended as peer to peer communication.  It is written in medical language and may contain abbreviations or verbiage that are unfamiliar.  It may appear blunt or direct.  Medical documents are intended to carry relevant information, facts as evident, and the clinical opinion of the practitioner.

## 2024-12-30 ENCOUNTER — OFFICE VISIT (OUTPATIENT)
Dept: PHYSICAL THERAPY | Facility: HOSPITAL | Age: 69
End: 2024-12-30
Attending: OBSTETRICS & GYNECOLOGY
Payer: MEDICARE

## 2024-12-30 PROCEDURE — 97112 NEUROMUSCULAR REEDUCATION: CPT

## 2024-12-30 PROCEDURE — 97110 THERAPEUTIC EXERCISES: CPT

## 2024-12-30 NOTE — PROGRESS NOTES
Diagnosis:   Dyspareunia, female (N94.10)       Referring Provider: Martha Sellers  Date of Evaluation:    11/13/2024    Precautions:  latex allergy Next MD visit:   none scheduled  Date of Surgery: n/a   Insurance Primary/Secondary: MEDICARE / [place] INDEMNITY     # Auth Visits: medicare            Subjective: No intercourse this past week due to spouse not feeling feel. Gets strong urge when bladder is full and doing strategies with success. Has had some leakage transferring sit>stand but had included kegel's which is helping. Frequency 2 to 2 1/2 hours. Can walk further without urgency. Feels like she needs more pelvic floor releases.  Doing some pelvic floor stretches.     Pain: 0/10 Some times has L low back but no pain today      Objective: Tx flow sheet     Biofeedback: Recruitment good, holding ability good, derecruitment good, baseline between contractions 2.0-3.5Ua, baseline resting average 2UaUa (normal 2.0Ua)      Initial evaluation: + updates  URINARY HABITS  Types of symptoms: urge incontinence  Events associated with the onset of urinary complaints: pelvic surgery  Urinary Frequency: every 2 hours  Leaking occurs: Urgency  Episodes of Leakage: 1-2 times per week  Amount of leakage: min  Nocturia: 1x  Empty bladder just in case: has stopped doing this      BOWEL HABITS  Types of symptoms: none   Frequency of bowel movements: 1x-uses squatty potty  Stool consistency: [place] Stool Scale: 3    SEXUAL HEALTH STATUS  Sexual [place] Status: active  Pain with initial and/or deep : initial>deep    Range Of Motion  Lumbar AROM screen: wnl  LE AROM screen: grossly WFL     Strength (MMT) 5/5 NOE LE   Transverse Abdominis: 1/5    Flexibility Summary: WFL NOE LE except L>R piriformis tightness    Informed consent for internal pelvic evaluation given: Yes    External Observation:   Voluntary contraction: present   Voluntary relaxation: present  Involuntary contraction: present  Involuntary relaxation:  present    Mons pubis: WNL  Labia majora: WNL  Labia minora: WNL  Urethral meatus: WNL  Introitus: WNL  Perineal body: WNL    Sensory/Reflex:  Vestibule: normal bilaterally      Internal Examination   Pelvic Floor Muscle strength: (PERF= Power/Endurance/Reps/Fast) MMT: 2/3/4/4  Accessory Muscle Use: abdominals    Tissue Laxity Test:  Anterior Wall: WNL  Posterior Wall: WNL  Apical: WNL    Eccentric lengthening contraction: wnl  Bearing down Valsalva maneuver (2-3x): wnl    Internal Palpation: WNL except tenderness L levator ani and piriformis      Assessment: Improved bladder habits. Pt appears to understand all of the treatment options for dyspareunia that was discussed in session today. Progressed pelvic brace w simulated activities of daily living . Pt to include pelvic brace w sit>stand transferring to resolve leakage.      Goals:   Goals: (to be met in 10 visits)-progressing  1. Good knowledge of PFM contraction and relaxation.  2. Independent with HEP including core breath with diaphragmatic breathing and pelvic floor muscles .  3. Pt will have increased transverse abdominis muscle strength to 2/5  to assist with supporting pelvic floor muscles.   4. Social activity not limited by UI or pain.  5. Resume sexual activity with </=1/10/pain  6. Patient will demonstrate improve PFM isolation, coordination and strength so she is able to reduce urinary urge and prevent leakage during ADL's.    Plan: Continue plan of care as pt tolerates with focus on pelvic floor muscle rehab.   Date: 11/20/2024  TX#: 2/10 Date:           11/27/2024       TX#: 3/ Date:       12/11/2024           TX#: 4/ Date:        12/30/2024          TX#: 5/ Date:   Tx#: 6/     NM   Radha/phys pelvic floor muscles /diaphragm /Transverse abdominis /fascially connected mm education     [place] strategies    Retraining-  diaphragmatic breathing   abdominal bracing   Kegel   W visual cueing and manual cueing   NM   Radha/phys pelvic floor muscles  /diaphragm /Transverse abdominis /fascially connected mm education     Bladder fitness-handout    Pelvic brace-  Explored isolation  pelvic floor muscles   Transverse abdominis -  handout NM   Radha/phys pelvic floor muscles /diaphragm /Transverse abdominis /fascially connected mm education     Bladder fitness-reviewed    Pelvic brace-  Explored isolation  pelvic floor muscles   Transverse abdominis     biofeedback  external sensors were placed and leads were attached  Resting tone  Tonic  Phasic  [place]  With coordinated breathing    kegel +  *supine  *Sit  *[place]  *Stand  *sit<>stand     Biofeedback with constant interpretation of results.  NM   Radha/phys pelvic floor muscles /diaphragm /Transverse abdominis /fascially connected mm education     Bladder fitness-reviewed    Pelvic brace-  Explored isolation  pelvic floor muscles   Transverse abdominis     Dyspareunia treatment options:tissue issue/dilator/pelvic wand/relaxation/internal muscle releases  Models shown      TE  Piriformis stretch R/L x30 sec    Kegel + abdominal bracing +  Iso hip add  1. Supine  2. Pelvic Tilt  3. 4 pt   4. Bridge  5. Standing  6. Fxnl squat  10x ea     Kegel +  Resisted   ER RTB  x10     Kegel +  U/LE lift  x10     Issued HEP for above exs  Latex free RTB   TE    Kegel +  iso hip add  10\" x10     Kegel + articulating bridge x10 w isometric adduction     Kegel + clamshell RTB x10 R/L      Kegel +  Resisted   ER RTB  x10     Kegel +  U/LE lift  x10     Kegel with plie/ER resisted RTB x10     HEP issued for above    W abdominal bracing for above exs TE    Kegel  diaphragmatic breathing   abdominal bracing   w biofeedback TE    Kegel w diaphragmatic breathing x20    NuStep 5min L5     Shuttle- DLP  37# with pelvic brace + iso hip adduction x30 reps    Piriformis stretch R/L 1 min ea    Deadlift 10# x10 ea w coordinated breathing   + pelvic brace -.      HEP: diaphragmatic breathing , abdominal bracing , Kegel <> PFM  lengthening/diaphragmatic breathing 10 repetitions 3x/day slow, + additions above     Charges: NM2  TE 15     Total Timed Treatment: 45 min  Total Treatment Time: 45 min

## 2025-01-06 ENCOUNTER — APPOINTMENT (OUTPATIENT)
Dept: PHYSICAL THERAPY | Facility: HOSPITAL | Age: 70
End: 2025-01-06
Attending: OBSTETRICS & GYNECOLOGY
Payer: MEDICARE

## 2025-01-13 ENCOUNTER — APPOINTMENT (OUTPATIENT)
Dept: PHYSICAL THERAPY | Facility: HOSPITAL | Age: 70
End: 2025-01-13
Attending: OBSTETRICS & GYNECOLOGY
Payer: MEDICARE

## 2025-01-13 ENCOUNTER — TELEPHONE (OUTPATIENT)
Dept: PHYSICAL THERAPY | Facility: HOSPITAL | Age: 70
End: 2025-01-13

## 2025-01-20 ENCOUNTER — OFFICE VISIT (OUTPATIENT)
Dept: PHYSICAL THERAPY | Facility: HOSPITAL | Age: 70
End: 2025-01-20
Attending: OBSTETRICS & GYNECOLOGY
Payer: MEDICARE

## 2025-01-20 PROCEDURE — 97112 NEUROMUSCULAR REEDUCATION: CPT

## 2025-01-20 PROCEDURE — 97110 THERAPEUTIC EXERCISES: CPT

## 2025-01-20 PROCEDURE — 97140 MANUAL THERAPY 1/> REGIONS: CPT

## 2025-01-20 NOTE — PROGRESS NOTES
Discharge Summary  Pt has attended 6 visits in Physical Therapy.      Diagnosis:   Dyspareunia, female (N94.10)       Referring Provider: Martha Sellers  Date of Evaluation:    11/13/2024    Precautions:  latex allergy Next MD visit:   none scheduled  Date of Surgery: n/a   Insurance Primary/Secondary: MEDICARE / [place] INDEMNITY     # Auth Visits: medicare            Subjective: Less pain with intercourse. Using estradiol and moisturizer every night. Has pain only on the left side of vagina when using dilator. Having 50% less urine leakage. Gets strong urge when bladder is full and doing strategies with success. Has tried to \"push\" the time between urine voiding with mixed results. Can walk further without urgency. Pt highly satisfied with results from physical therapy.   Pt describes pain level: current 0/10, best 0/10, worst 2-3/10 (was:6/10) intercourse  PFDI-20: 20.83/300 (was:90.63/300)      Objective: Tx flow sheet     URINARY HABITS  Types of symptoms: urge incontinence-IMPROVED  Urinary Frequency: 2 1/2 hours (WAS:every 2 hours)  Leaking occurs: Urgency-OFTENTIMES WHEN WAITS TOO LONG  Episodes of Leakage: 1X/WEEK (WAS:1-2 times per week)  Amount of leakage: min  Nocturia: 1x  Empty bladder just in case: has stopped doing this      BOWEL HABITS  Types of symptoms: none   Frequency of bowel movements: 1x-uses squatty potty  Stool consistency: [place] Stool Scale: 3    SEXUAL HEALTH STATUS  Sexual Ramer Status: active  Pain with initial and/or deep : initial>deep-LESS PAIN     Range Of Motion  Lumbar AROM screen: wnl  LE AROM screen: grossly WFL     Strength (MMT) 5/5 NOE LE   Transverse Abdominis: 2/4 (WAS: 1/5)    Flexibility Summary: WFL NOE LE except L>R piriformis tightness-IMPROVED    Informed consent for internal pelvic evaluation given: Yes    External Observation:   Voluntary contraction: present   Voluntary relaxation: present  Involuntary contraction: present  Involuntary relaxation:  present    Mons pubis: WNL  Labia majora: WNL  Labia minora: WNL  Urethral meatus: WNL  Introitus: WNL  Perineal body: WNL    Sensory/Reflex:  Vestibule: normal bilaterally      Internal Examination   Pelvic Floor Muscle strength: (PERF= Power/Endurance/Reps/Fast) MMT: 3/6/6/8 (was:2/3/4/4)    Tissue Laxity Test:  Anterior Wall: WNL  Posterior Wall: WNL  Apical: WNL    Eccentric lengthening contraction: wnl  Bearing down Valsalva maneuver (2-3x): wnl    Internal Palpation: WNL (was: tenderness L levator ani and piriformis)      12/11/2024 Biofeedback: Recruitment good, holding ability good, derecruitment good, baseline between contractions 2.0-3.5Ua, baseline resting average 2UaUa (normal 2.0Ua)      Assessment: Pt has made significant improvement in physical therapy with improved bladder habits, decreased urine leakage, increased core strength, and function with less pain with intercourse.  Discussed bladder retraining/habits again to assist with resolution of leakage. Added: pelvic floor muscle external releases to HEP. All goals met.  Pt motivated to continue HEP.    PFDI: 69.8 pt improvement    Goals:   Goals: (to be met in 10 visits)-  1. Good knowledge of PFM contraction and relaxation.-MET  2. Independent with HEP including core breath with diaphragmatic breathing and pelvic floor muscles .-MET  3. Pt will have increased transverse abdominis muscle strength to 2/5  to assist with supporting pelvic floor muscles.-MET   4. Social activity not limited by UI or pain.-MET  5. Resume sexual activity with </=1/10/pain-ALMOST MET  6. Patient will demonstrate improve PFM isolation, coordination and strength so she is able to reduce urinary urge and prevent leakage during ADL's.-MET      Plan: Pt considered discharged from physical therapy.  Pt instructed to call clinic if he/she has any further questions and to continue home exercise program.     Patient/Family/Caregiver was advised of these findings, precautions, and  treatment options and has agreed to actively participate in planning and for this course of care.    Thank you for your referral. If you have any questions, please contact me at Dept: [phone].    Sincerely,  Electronically signed by therapist: Emani Restrepo PT     Physician's certification required:  No  Please co-sign or sign and return this letter via fax as soon as possible to [phone].   I certify the need for these services furnished under this plan of treatment and while under my care.    X___________________________________________________ Date____________________    Certification From: 1/20/2025  To:4/20/2025     Date: 11/20/2024  TX#: 2/10 Date:           11/27/2024       TX#: 3/ Date:       12/11/2024           TX#: 4/ Date:        12/30/2024          TX#: 5/ Date: 1/20/2025   Tx#: 6/   NM   Radha/phys pelvic floor muscles /diaphragm /Transverse abdominis /fascially connected mm education     [place] strategies    Retraining-  diaphragmatic breathing   abdominal bracing   Kegel   W visual cueing and manual cueing   NM   Radha/phys pelvic floor muscles /diaphragm /Transverse abdominis /fascially connected mm education     Bladder fitness-handout    Pelvic brace-  Explored isolation  pelvic floor muscles   Transverse abdominis -  handout NM   Radha/phys pelvic floor muscles /diaphragm /Transverse abdominis /fascially connected mm education     Bladder fitness-reviewed    Pelvic brace-  Explored isolation  pelvic floor muscles   Transverse abdominis     biofeedback  external sensors were placed and leads were attached  Resting tone  Tonic  Phasic  [place]  With coordinated breathing    kegel +  *supine  *Sit  *March  *Stand  *sit<>stand     Biofeedback with constant interpretation of results.  NM   Radha/phys pelvic floor muscles /diaphragm /Transverse abdominis /fascially connected mm education     Bladder fitness-reviewed    Pelvic brace-  Explored isolation  pelvic floor muscles   Transverse  abdominis     Dyspareunia treatment options:tissue issue/dilator/pelvic wand/relaxation/internal muscle releases  Models shown NM   PFDI scoring and interpretion with patient strategies to reduce impairments.   Radha/phys pelvic floor muscles /diaphragm /Transverse abdominis /fascially connected mm education     Bladder habits/strategies  Bladder retraining-HEP    Pelvic brace-  Explored isolation  pelvic floor muscles   Transverse abdominis     Dyspareunia treatment options:tissue issue/dilator/pelvic wand/relaxation/internal muscle releases/flex bar             TE  Piriformis stretch R/L x30 sec    Kegel + abdominal bracing +  Iso hip add  1. Supine  2. Pelvic Tilt  3. 4 pt   4. Bridge  5. Standing  6. Fxnl squat  10x ea     Kegel +  Resisted   ER RTB  x10     Kegel +  U/LE lift  x10     Issued HEP for above exs  Latex free RTB   TE    Kegel +  iso hip add  10\" x10     Kegel + articulating bridge x10 w isometric adduction     Kegel + clamshell RTB x10 R/L      Kegel +  Resisted   ER RTB  x10     Kegel +  U/LE lift  x10     Kegel with plie/ER resisted RTB x10     HEP issued for above    W abdominal bracing for above exs TE    Kegel  diaphragmatic breathing   abdominal bracing   w biofeedback TE    Kegel w diaphragmatic breathing x20    NuStep 5min L5     Shuttle- DLP  37# with pelvic brace + iso hip adduction x30 reps    Piriformis stretch R/L 1 min ea    Deadlift 10# x10 ea w coordinated breathing   + pelvic brace -. TE    Progress note     NuStep 5min L5     Reviewed HEP with updates    Flex bar L/R levator ani 1 min-HEP    Piriformis stretches R/L x30 sec        Manual therapy:   Internal re-assessment and retraining pelvic floor muscles    HEP: diaphragmatic breathing , abdominal bracing , Kegel <> PFM lengthening/diaphragmatic breathing 10 repetitions 3x/day slow, + additions above     Charges: NM 15  TE 15 MT 15    Total Timed Treatment: 45 min  Total Treatment Time: 45 min

## 2025-02-16 ENCOUNTER — OFFICE VISIT (OUTPATIENT)
Dept: FAMILY MEDICINE CLINIC | Facility: CLINIC | Age: 70
End: 2025-02-16
Payer: MEDICARE

## 2025-02-16 VITALS
BODY MASS INDEX: 25.03 KG/M2 | SYSTOLIC BLOOD PRESSURE: 116 MMHG | TEMPERATURE: 98 F | HEART RATE: 78 BPM | DIASTOLIC BLOOD PRESSURE: 71 MMHG | RESPIRATION RATE: 18 BRPM | WEIGHT: 136 LBS | HEIGHT: 62 IN | OXYGEN SATURATION: 100 %

## 2025-02-16 DIAGNOSIS — R68.89 FLU-LIKE SYMPTOMS: Primary | ICD-10-CM

## 2025-02-16 PROCEDURE — 87637 SARSCOV2&INF A&B&RSV AMP PRB: CPT | Performed by: NURSE PRACTITIONER

## 2025-02-16 NOTE — PROGRESS NOTES
CHIEF COMPLAINT:     Chief Complaint   Patient presents with    Cough     Cough and body chills . Symptoms started yesterday   OTC: none   No exposure        HPI:   Yamile Marquez is a 69 year old female who presents for upper respiratory symptoms for  1 days. Patient reports cough, body aches, chills. Symptoms have been persistent, mild since onset.  Treating symptoms with none.      Current Outpatient Medications   Medication Sig Dispense Refill    rosuvastatin 40 MG Oral Tab Take 1 tablet (40 mg total) by mouth nightly.      clobetasol 0.05 % External Cream       estradiol 0.1 MG/GM Vaginal Cream Apply 1/2 gram vaginally 2 times per week. 42.5 g 3    alendronate 70 MG Oral Tab Take 1 tablet (70 mg total) by mouth every 7 days.      RESTASIS 0.05 % Ophthalmic Emulsion Place 1 drop into both eyes in the morning and 1 drop before bedtime.      Glycerin PF (OASIS TEARS PF) 0.22 % Ophthalmic Solution Place 1 drop into both eyes every morning.      CALCIUM CITRATE OR Take 500 mg by mouth daily.      Coenzyme Q10 (COQ10) 200 MG Oral Cap Take 1 capsule by mouth daily.      Multiple Vitamins-Minerals (PRESERVISION AREDS) Oral Tab Take 1 tablet by mouth daily.      Collagen-Boron-Hyaluronic Acid (MOVE FREE ULTRA JOINT HEALTH) 40-5-3.3 MG Oral Tab Take 1 tablet by mouth daily.      OMEGA-3 FATTY ACIDS OR Take 1,000 mg by mouth daily.      Probiotic Product (PROBIOTIC OR) Take 1 capsule by mouth daily.      TURMERIC OR Take by mouth daily.      aspirin 81 MG Oral Tab Take 1 tablet (81 mg total) by mouth daily. 30 tablet 3    Enalapril Maleate (VASOTEC) 20 MG Oral Tab Take 1 tablet (20 mg total) by mouth daily.        Past Medical History:    Change in hair    thinning hair - ridges in nails    Decorative tattoo    Diverticulosis    Diverticulosis of intestine    Dyspareunia    Easy bruising    Eye disease    Family history of breast cancer    H/O mammogram    benign    High blood pressure    High cholesterol    taking  enalapril and cholesterol is now at good range    Hyperlipidemia    taking medication    Hypertension    OAB (overactive bladder)    MANSOOR (obstructive sleep apnea)    AHI 5.2  REM AHI 35.2 Supine AHI 10.7 non-supine AHI 0 Sao2 Paulie     Pap smear for cervical cancer screening    WNL    PONV (postoperative nausea and vomiting)    Skin cancer    Basal cell on nose removed with mohs surgery    Sleep apnea    uses cpap    Unspecified essential hypertension    Urinary incontinence    Visual impairment    glasses    Wears glasses      Past Surgical History:   Procedure Laterality Date    Anterior repair  2021    Breast biopsy      Benign    Cataract  2019    Only left eye    Colonoscopy      last one was done in     Colonoscopy      return in 7 years per patient    Eye surgery      Crossed eyed surgery as young child    Hand/finger surgery unlisted  2012    middle right - no metal    Hysterectomy  2000    ELMER    Edson biopsy stereo nodule 1 site right (cpt=19081) Right 2020    benign.    Midurethral sling  2021      1981    Oophorectomy Left 2016    left salpingooopherectomy    Posterior repair  2021    Skin surgery  2022    Tonsillectomy      Total abdom hysterectomy  2000    Uterosacral ligament suspension  2021         Social History     Socioeconomic History    Marital status:    Tobacco Use    Smoking status: Former     Current packs/day: 0.00     Average packs/day: 1 pack/day for 20.0 years (20.0 ttl pk-yrs)     Types: Cigarettes     Start date: 1967     Quit date: 1987     Years since quittin.9    Smokeless tobacco: Never   Vaping Use    Vaping status: Never Used   Substance and Sexual Activity    Alcohol use: Yes     Alcohol/week: 6.0 standard drinks of alcohol     Types: 6 Glasses of wine per week    Drug use: No    Sexual activity: Yes     Partners: Male   Other Topics Concern    Caffeine Concern No    Stress Concern No     Weight Concern No    Special Diet No    Exercise Yes    Seat Belt Yes         REVIEW OF SYSTEMS:   GENERAL: intact appetite  SKIN: no rashes or abnormal skin lesions  HEENT: See HPI  LUNGS: See HPI  CARDIOVASCULAR: denies chest pain or palpitations   GI: denies N/V/C or abdominal pain      EXAM:   /71 (BP Location: Left arm, Patient Position: Sitting, Cuff Size: adult)   Pulse 78   Temp 98.4 °F (36.9 °C) (Oral)   Resp 18   Ht 5' 2\" (1.575 m)   Wt 136 lb (61.7 kg)   SpO2 100%   BMI 24.87 kg/m²   GENERAL: well developed, well nourished,in no apparent distress  SKIN: no rashes,  HEAD: atraumatic, normocephalic.  no tenderness on palpation of sinuses  EYES: conjunctiva clear, EOM intact  EARS: TM's grey, no bulging, no retraction, no fluid, bony landmarks visible  NOSE: Nostrils patent, no nasal discharge, nasal mucosa pink moist   THROAT: Oral mucosa pink, moist. Posterior pharynx is not erythematous. no exudates.   NECK: Supple, non-tender  LUNGS: clear to auscultation bilaterally, no wheezes or rhonchi. Breathing is non labored.  CARDIO: RRR without murmur  EXTREMITIES: no cyanosis, clubbing or edema  LYMPH:  no cervical lymphadenopathy.    PSYCH: pleasant mood and affect  NEURO: no focal deficits      ASSESSMENT AND PLAN:   Yamile Marquez is a 69 year old female who presents with upper respiratory symptoms that are consistent with    ASSESSMENT:   Encounter Diagnosis   Name Primary?    Flu-like symptoms Yes       PLAN:  Advsied pt sx are likely viral in nature  Covid/flu/rsv today  Rest, push fluids, supportive care  Comfort care as described in Patient Instructions  The patient indicates understanding of these issues and agrees to the plan.  The patient is asked to f/u with PCP if sx's persist or worsen.  There are no Patient Instructions on file for this visit.

## 2025-02-17 ENCOUNTER — TELEPHONE (OUTPATIENT)
Dept: FAMILY MEDICINE CLINIC | Facility: CLINIC | Age: 70
End: 2025-02-17

## 2025-02-17 DIAGNOSIS — J10.1 INFLUENZA A: Primary | ICD-10-CM

## 2025-02-17 LAB
FLUAV + FLUBV RNA SPEC NAA+PROBE: DETECTED
FLUAV + FLUBV RNA SPEC NAA+PROBE: NOT DETECTED
RSV RNA SPEC NAA+PROBE: NOT DETECTED
SARS-COV-2 RNA RESP QL NAA+PROBE: NOT DETECTED

## 2025-02-17 RX ORDER — OSELTAMIVIR PHOSPHATE 75 MG/1
75 CAPSULE ORAL 2 TIMES DAILY
Qty: 10 CAPSULE | Refills: 0 | Status: SHIPPED | OUTPATIENT
Start: 2025-02-17 | End: 2025-02-22

## 2025-02-17 NOTE — TELEPHONE ENCOUNTER
Patient would like tamiflu. Symptoms started on 02/15 in the evening, within the 48 hours of onset of symptoms.

## 2025-04-29 DIAGNOSIS — N95.2 POSTMENOPAUSAL ATROPHIC VAGINITIS: ICD-10-CM

## 2025-04-30 RX ORDER — ESTRADIOL 0.1 MG/G
0.5 CREAM VAGINAL
Qty: 42.5 G | Refills: 3 | Status: SHIPPED | OUTPATIENT
Start: 2025-04-30

## (undated) DEVICE — STERILE POLYISOPRENE POWDER-FREE SURGICAL GLOVES: Brand: PROTEXIS

## (undated) DEVICE — SUTURE VICRYL 0 CT-1

## (undated) DEVICE — #15 STERILE STAINLESS BLADE: Brand: STERILE STAINLESS BLADES

## (undated) DEVICE — STANDARD HYPODERMIC NEEDLE,POLYPROPYLENE HUB: Brand: MONOJECT

## (undated) DEVICE — CAUTERY PENCIL

## (undated) DEVICE — VIOLET BRAIDED (POLYGLACTIN 910), SYNTHETIC ABSORBABLE SUTURE: Brand: COATED VICRYL

## (undated) DEVICE — USE ITEM #176901

## (undated) DEVICE — SCINTILLANT SURGICAL LIGHT WITH RETRACTOR: Brand: SCINTILLANT

## (undated) DEVICE — SUTURE VICRYL 2-0 CT-1

## (undated) DEVICE — SCD SLEEVE KNEE HI BLEND

## (undated) DEVICE — Device

## (undated) DEVICE — MEDI-VAC NON-CONDUCTIVE SUCTION TUBING: Brand: CARDINAL HEALTH

## (undated) DEVICE — GYN CDS: Brand: MEDLINE INDUSTRIES, INC.

## (undated) DEVICE — STERILE POLYISOPRENE POWDER-FREE SURGICAL GLOVES WITH EMOLLIENT COATING: Brand: PROTEXIS

## (undated) DEVICE — TUBING CYSTO

## (undated) DEVICE — SOL H2O IV

## (undated) DEVICE — RETRACTOR LONE STAR STAYS LG

## (undated) DEVICE — REM POLYHESIVE ADULT PATIENT RETURN ELECTRODE: Brand: VALLEYLAB

## (undated) DEVICE — BAG DRAIN INFECTION CNTRL 2000

## (undated) DEVICE — SPONGE RAYTEC 4X4 RF DETECT

## (undated) DEVICE — COVER,MAYO STAND,STERILE: Brand: MEDLINE

## (undated) DEVICE — SUTURE VICRYL 1 CT-1

## (undated) DEVICE — SOL  .9 1000ML BTL

## (undated) NOTE — ED AVS SNAPSHOT
[facility] BETTY Jackson    [address] [place] [address]    Phone:  [phone]    Fax:  [phone]           Jorge Alberto Zaragoza   MRN: [id number]    Department:  [facility]   Date of Visit: Expect to receive an electronic request (by e-mail or text) to complete a self-assessment the day after your visit. You may also receive a call from our patient liason soon after your visit.  Also, some patients receive a detailed feedback survey mailed to [facility] [address]  ([address]) [address] [facility] [phone] 49[address] [facility] 30. ([address] [place] [address]) [phone] [address] [address] [address] ( Call [phone] for help. [employer] is NOT to be used for urgent needs. For medical emergencies, dial 911.

## (undated) NOTE — LETTER
COLLIN Notifier: [employer]   MIN. Patient Name: Lebron Thakur. Identification Number: [id number]      Advance Beneficiary Notice of Noncoverage (ABN)  NOTE:  If Medicare doesn’t pay for D. Item/service(s) below, you may have to pay.   Medicare do ? OPTION 2. I want the D. Item/service(s) listed above, but do not bill Medicare. You may ask to be paid now as I am responsible for payment. I cannot appeal if Medicare is not billed. ? OPTION 3. I don’t want the D. Item/service(s) listed above.  I unde

## (undated) NOTE — LETTER
[address]  [id number]  [place] [address]  [facility]: [phone]  FAX: [phone]   Consent to Procedure/Sedation    Date: __5/26/2020_____    Time: ___8:48 AM ___    1.  I authorize the performance ___________________________    Signature of person authorized to consent for patient: Relationship to patient:  ___________________________    ___________________    Witness: ____________________     Date: ______________    Printed: 5/26/2020   8:48 AM

## (undated) NOTE — LETTER
Patient Name: Yamile Marquez  YOB: 1955          MRN :  [id number]  Date:  1/20/2025  Referring Physician:  Martha Sellers    Discharge Summary  Pt has attended 6 visits in Physical Therapy.      Diagnosis:   Dyspareunia, female (N94.10)       Referring Provider: Martha Sellers  Date of Evaluation:    11/13/2024    Precautions:  latex allergy Next MD visit:   none scheduled  Date of Surgery: n/a   Insurance Primary/Secondary: MEDICARE / [place]MN[place]     # Auth Visits: medicare            Subjective: Less pain with intercourse. Using estradiol and moisturizer every night. Has pain only on the left side of vagina when using dilator. Having 50% less urine leakage. Gets strong urge when bladder is full and doing strategies with success. Has tried to \"push\" the time between urine voiding with mixed results. Can walk further without urgency. Pt highly satisfied with results from physical therapy.   Pt describes pain level: current 0/10, best 0/10, worst 2-3/10 (was:6/10) intercourse  PFDI-20: 20.83/300 (was:90.63/300)      Objective: Tx flow sheet     URINARY HABITS  Types of symptoms: urge incontinence-IMPROVED  Urinary Frequency: 2 1/2 hours (WAS:every 2 hours)  Leaking occurs: Urgency-OFTENTIMES WHEN WAITS TOO LONG  Episodes of Leakage: 1X/WEEK (WAS:1-2 times per week)  Amount of leakage: min  Nocturia: 1x  Empty bladder just in case: has stopped doing this      BOWEL HABITS  Types of symptoms: none   Frequency of bowel movements: 1x-uses squatty potty  Stool consistency: [place] Stool Scale: 3    SEXUAL HEALTH STATUS  Sexual Raub Status: active  Pain with initial and/or deep : initial>deep-LESS PAIN     Range Of Motion  Lumbar AROM screen: wnl  LE AROM screen: grossly WFL     Strength (MMT) 5/5 NOE LE   Transverse Abdominis: 2/4 (WAS: 1/5)    Flexibility Summary: WFL NOE LE except L>R piriformis tightness-IMPROVED    Informed consent for internal pelvic evaluation given: Yes    External  Observation:   Voluntary contraction: present   Voluntary relaxation: present  Involuntary contraction: present  Involuntary relaxation: present    Mons pubis: WNL  Labia majora: WNL  Labia minora: WNL  Urethral meatus: WNL  Introitus: WNL  Perineal body: WNL    Sensory/Reflex:  Vestibule: normal bilaterally      Internal Examination   Pelvic Floor Muscle strength: (PERF= Power/Endurance/Reps/Fast) MMT: 3/6/6/8 (was:2/3/4/4)    Tissue Laxity Test:  Anterior Wall: WNL  Posterior Wall: WNL  Apical: WNL    Eccentric lengthening contraction: wnl  Bearing down Valsalva maneuver (2-3x): wnl    Internal Palpation: WNL (was: tenderness L levator ani and piriformis)      12/11/2024 Biofeedback: Recruitment good, holding ability good, derecruitment good, baseline between contractions 2.0-3.5Ua, baseline resting average 2UaUa (normal 2.0Ua)      Assessment: Pt has made significant improvement in physical therapy with improved bladder habits, decreased urine leakage, increased core strength, and function with less pain with intercourse.  Discussed bladder retraining/habits again to assist with resolution of leakage. Added: pelvic floor muscle external releases to HEP. All goals met.  Pt motivated to continue HEP.    PFDI: 69.8 pt improvement    Goals:   Goals: (to be met in 10 visits)-  1. Good knowledge of PFM contraction and relaxation.-MET  2. Independent with HEP including core breath with diaphragmatic breathing and pelvic floor muscles .-MET  3. Pt will have increased transverse abdominis muscle strength to 2/5  to assist with supporting pelvic floor muscles.-MET   4. Social activity not limited by UI or pain.-MET  5. Resume sexual activity with </=1/10/pain-ALMOST MET  6. Patient will demonstrate improve PFM isolation, coordination and strength so she is able to reduce urinary urge and prevent leakage during ADL's.-MET      Plan: Pt considered discharged from physical therapy.  Pt instructed to call clinic if he/she has  any further questions and to continue home exercise program.     Patient/Family/Caregiver was advised of these findings, precautions, and treatment options and has agreed to actively participate in planning and for this course of care.    Thank you for your referral. If you have any questions, please contact me at Dept: [phone].    Sincerely,  Electronically signed by therapist: Emani Restrepo PT     Physician's certification required:  No  Please co-sign or sign and return this letter via fax as soon as possible to [phone].   I certify the need for these services furnished under this plan of treatment and while under my care.    X___________________________________________________ Date____________________    Certification From: 1/20/2025  To:4/20/2025 21st Century Cures Act Notice to Patient: Medical documents like this are made available to patients in the interest of transparency. However, be advised this is a medical document and it is intended as [id number] communication between your medical providers. This medical document may contain abbreviations, assessments, medical data, and results or other terms that are unfamiliar. Medical documents are intended to carry relevant information, facts as evident, and the clinical opinion of the practitioner. As such, this medical document may be written in language that appears blunt or direct. You are encouraged to contact your medical provider and/or [facility] Patient Experience if you have any questions about this medical document.

## (undated) NOTE — MR AVS SNAPSHOT
[address], [facility] [address] [address] [phone]               Thank you for choosing us for your health care visit with Marta Zapata MD.  We are glad to serve you and happy to provide you with this sum MyChart questions? Call [phone] for help. [employer] is NOT to be used for urgent needs. For medical emergencies, dial 911.         Educational Information     Healthy Diet and Regular Exercise  [employer] [facility] [address] [place] [address] [employer]

## (undated) NOTE — LETTER
Patient Name: Margaret Alcazar  YOB: 1955          MRN :  [id number]  Date:  8/24/2020  Referring Physician:  Gaston Rosa    Discharge Summary  Pt has attended 5 visits in Physical Therapy.       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

## (undated) NOTE — ED AVS SNAPSHOT
[facility] BETTY Roy    [address] [place] [address]    Phone:  [phone]    Fax:  [phone]           Josh Macdonald   MRN: [id number]    [facility]   Date of Visit: Click [email or web address]      Or call [phone]    If you have any problems with your follow-up, please call our  at [phone].     Si usted tiene algun problema con armenta sequimiento, por favor llame a nuestro adminstrador de casos al (6 Pharmacy Address Phone Number   [facility] [address] ([address] [facility]) [place] ([address] [place], [place] [address]   [place] [address].  ([address] [place] [address]) [phone]   [facility] Summaries. If you've been to the Emergency Department or your doctor's office, you can view your past visit information in [email or web address] by going to Visits < Visit Summaries. [email or web address] questions? Call [phone] for help.   [email or web address] is NOT to be used for urge

## (undated) NOTE — LETTER
Date: May 16, 2023      Patient Name: Dominick Dickson      : 1955        Thank you for choosing  [facility] as your health care provider. Your physician has deemed the following medical service(s) necessary. However, your insurance plan may not pay for all of your health care and costs and may deny payment for this service. The fact that your insurance plan does not pay for an item or service does not mean you should not receive it. The purpose of this form is to help you make an informed decision about whether or not you want to receive this service(s) that may not be paid for by your insurance plan. CPT Code Description     Cost     _________ ______________________________  _____________      _________ ______________________________ _____________      _________ ______________________________ _____________      I understand that the above mentioned service(s) or supply may not be covered by my insurance company.  I agree to be financially responsible for the cost of this service or supply in the event of my insurance denies payment as a non-covered benefit.        ______________________________________________________________________  Signature of Patient or Patient's Representative  Relationship  Date    ______________________________________________________________________  Signature of Witness to signing of form   Printed Name